# Patient Record
(demographics unavailable — no encounter records)

---

## 2024-10-08 NOTE — OB HISTORY
[Regular Cycle Intervals] : periods have been regular [Menarche Age: ____] : age at menarche was [unfilled] [___] : Full Term: [unfilled]

## 2024-10-09 NOTE — HISTORY OF PRESENT ILLNESS
[Disease: _____________________] : Disease: [unfilled] [T: ___] : T[unfilled] [N: ___] : N[unfilled] [M: ___] : M[unfilled] [AJCC Stage: ____] : AJCC Stage: [unfilled] [de-identified] : Metastatic left breast cancer with bone metastases diagnosed at age 39. 12/26/21 CTA done when patient presented with right sided CP revealed no evidence of pulmonary embolism. Appearance of osseous metastases within the spine and ribs with associated pathologic fractures of the right third rib, right sixth rib and superior endplate of the T3 vertebral body. Subtle asymmetric skin thickening in the left breast and left axillary lymphadenopathy is suspicious for underlying left sided breast cancer. Left axillary lymphadenopathy is noted.  12/30/21 Mammogram and sonogram revealed a 7 x 5.6 x 6.5 asymmetry with nodularity, calcification and distortion in the left superior breast extending across the midline. Left axillary lymphadenopathy is noted. 01/08/22 CT scan C/A/P: Few scattered subcentimeter pulmonary nodules up to 3 mm in size are identified. Left axillary pathologic adenopathy with lymph node conglomerate measuring 2.9 x 1.9 cm, similar to prior. Several additional borderline enlarged left-sided retropectoral nodes are identified. There is a left internal mammary node measuring 5 mm. Small volume right axillary nodes. Tiny supraclavicular nodes. Anterior mediastinal soft tissue with interspersed fat is favored to represent involuting/residual thymus. There is a 3.0 x 2.0 cm left breast mass, concerning for primary neoplasm. There is associated left breast skin thickening. Inferior right hepatic lobe lesions again identified, with peripheral discontinuous nodular enhancement, slightly increased since 2018, most consistent with hemangiomas. There is a 1.5 cm nodular intraluminal soft tissue density associated with the proximal transverse colon. Scattered lytic lesions of the bones are noted at T3 associated with superior endplate depression, T8, and T11 vertebral bodies. Right anterior third and anterolateral sixth rib probable pathologic fractures. There are additional L2-L4 vertebral body lytic lesions. Additionally, there is a right sacral mass which is extending into the sacroiliac joint and neural foramina, image 125 series 2 measuring 4.2 x 3.3 cm. Solid nodular component within the right sacral mass measures 2.6 cm. 1/13/22 Sonogram guided biopsy of the left breast revealed DCIS and invasive ductal carcinoma, moderately differentiated, ER/[place] 90%, HER-2 negative (IHC 1+ membrane staining). Biopsy of left axilla positive for similar IDC. Biopsy of right breast at 9:00 revealed fibrocystic changes and usual ductal hyperplasia. 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 Increased uptake in right 6th rib c/w fracture noted on CT. Photopenic are in right sacrum c/w right sacral mass on CT. Additional focal uptake in the left parietal bone. 2/04/22 Letrozole and Ibrance started. Ibrance held for one week during radiation 2/16/22 - 02/23/22 Palliative radiation to the sacrum, 2000 cGy in 5 fractions and T2-4 with 2000 cGy in 5 fractions with Dr. Samantha Batres for severe pain 4/7/22 BSO showed benign fallopian tubes and ovaries. 4/2023 Anastrozole started in place of letrozole due to arthralgias with significant improvement. 7/5/23 Guardant 360 did not show any actionable mutations. [de-identified] : ~4 cm Invasive ductal carcinoma, SBR 6/9, +LVI, ER/[place] 90%, HER-2 negative [de-identified] : CA 27-29 54.6 prior to treatment and then normalized, CEA normal [de-identified] : Jacy started Lupron 1/21/22 and then 2 weeks later started on Letrozole and Ibrance on 02/04/22. S/P BSO on 4/7/22.   She is currently on her week off of the  Ibrance. Xgeva due today and again in 01/2025 In 04/ 2023, she transitioned from letrozole to anastrozole, which resolved her generalized arthralgia; however, her right knee continued to be problematic.  She received a cortisone injection in her right knee in June 2023, providing relief from the knee pain for several months, and later had a course of viscous gel injections in 6/24 which helped. She still occasionally gets some discomfort with prolonged standing or walking. She continues to struggle with the hot flashes, particularly in the warm weather. Discussed trying Veozah. Her dermatologist prescribed Opzelura for her progressing Vitiligo, particularly on her face. She has hesitated to start the cream because it may potentially lower her WBC, and she is concerned since her WBC is already low from taking Ibrance. She has chronic GERD which is under control with Protonix and Pepcid. She can't take NSAIDs (including Celebrex) on a regular basis due to her stomach. She is on Lexapro 20 mg which has helped but she continues to struggle emotionally with her diagnosis and treatment effects, as well as the need for ongoing monitoring. Although she sees a therapist, she found attending a support group to be distressing. She has confided in close friends but has not disclosed her situation to others. She is  and has two children who are 9 and 11. Currently, her children are unaware of her diagnosis. She is employed as a  and has recently been promoted.  In 10/23 she developed right hip pain and had an MRI on 12/19/23 which showed a mild labral tear and tendinosis.  PCP: Rosie Myers MD Endocrinologist: Nelia Genao MD GYN: Milady Lamb MD  10/3/24 Bone scan; Abnormal bone scan. Persistent abnormalities in the skull, right sixth rib, and right sacroiliac region without significant interval change since the previous study of 5/15/2024. 10/03/24CT scan C/A/P showed Stable exam compared to prior 5/15/2024. 5/15/24 CT scan C/A/P showed bony metastatic disease, not significantly changed from prior examination 2/20/2024. Left pelvic soft tissue density, probably enlarged lymph node, also not significantly changed. New healing fracture of the right anterior lateral sixth rib. 5/15/24 Bone scan showed interval increase in uptake in a right 6th rib lesion. Interval resolution of uptake in the right 4th and 5th ribs. 2/20/24 CT scan C/A/P showed enlarging left lower quadrant/pelvic soft tissue nodular density measuring 1.5 cm x 1.2 cm anterior to the left external iliac artery, series 2 image 140 may represent an enlarging lymph node versus mesenteric implant. Stable osseous metastasis. 02/20/24 Bone scan showed new punctate right rib lesions, which are most consistent with posttraumatic changes. Recommend interval short-term follow-up to assess for healing versus progression of disease. Bone metastases evident on prior exam are similar to or less apparent than on prior exam. 11/10/23 Bone scan showed no significant interval change compared to the previous bone scan of 7/24/2023. 11/10/23 CT scan showed no significant change. Stable presumed bone metastases. 7/24/23 Bone scan showed Interval improvement of lesions along the right femoral shaft and in the right hemisacrum. Otherwise grossly stable exam. 7/19/23 CT scan showed stable disease. 4/14/23 CT scan showed stable disease. 1/16/23 Bone scan showed stability with slight interval improvement in previously noted skeletal foci of disease. No new foci. 12/8/22 CT scan C/A/P showed stable interval examination with stable scattered bilateral small pulmonary nodules and no significant interval change in mixed lytic and sclerotic osseous metastases. Redemonstrated ill defined left breast mass with biopsy clip and associated left spiculated axillary lymph node with biopsy clip. 9/16/22 CT scan C/A/P showed the 3.7 cm right sacral mass is unchanged. Small sclerotic lesion in the left iliac wing is unchanged in size, more sclerotic on the current study. Mixed lytic and sclerotic osseous metastases are again noted, some of which demonstrate slightly increased peripheral sclerosis, likely reflecting posttreatment change. Previously noted tiny bilateral subcentimeter pulmonary nodules are largely unchanged. A previously referenced 1 to 2 mm right lower lobe pulmonary nodule is not definitively seen on the current exam however. 07/01/22 Bone scan showed tracer distribution of metastatic lesions is not significantly changed. Tracer activity is mostly less intense/unchanged except in the right sacrum which has more intense uptake. Small foci of increased uptake in the mid and distal right femur, more conspicuous than the previous study. Degenerative disease in major joints. 4/26/22 CT scan C/A/P Interval decrease in size of left breast mass and axillary adenopathy. Majority of tiny pulmonary nodules are no longer visualized, as described above. Several lytic bone metastases demonstrate increased sclerosis, suggesting healing. A 3.6 cm right sacral lytic mass is unchanged. Indeterminate nodular fold thickening within the proximal/mid transverse colon, less prominent, however correlation with colonoscopy is suggested. Other findings are unchanged, as described above. 02/03/22 Bone scan showed increased uptake in T3, punctate uptake in T7 on the right, focal uptake in L3 and right L4, c/w lytic lesions on CT. Increased uptake in right 6th rib c/w fracture noted on CT. Photopenic are in right sacrum c/w right sacral mass on CT. Additional focal uptake in the left parietal bone. 01/08/22 CT scan C/A/P: Few scattered subcentimeter pulmonary nodules up to 3 mm in size are identified. Left axillary pathologic adenopathy with lymph node conglomerate measuring 2.9 x 1.9 cm, similar to prior. Several additional borderline enlarged left-sided retropectoral nodes are identified. There is a left internal mammary node measuring 5 mm. Small volume right axillary nodes. Tiny supraclavicular nodes. Anterior mediastinal soft tissue with interspersed fat is favored to represent involuting/residual thymus. There is a 3.0 x 2.0 cm left breast mass, concerning for primary neoplasm. There is associated left breast skin thickening. Inferior right hepatic lobe lesions again identified, with peripheral discontinuous nodular enhancement, slightly increased since 2018, most consistent with hemangiomas. There is a 1.5 cm nodular intraluminal soft tissue density associated with the proximal transverse colon. Scattered lytic lesions of the bones are noted at T3 associated with superior endplate depression, T8, and T11 vertebral bodies. Right anterior third and anterolateral sixth rib probable pathologic fractures. There are additional L2-L4 vertebral body lytic lesions. Additionally, there is a right sacral mass which is extending into the sacroiliac joint and neural foramina, image 125 series 2 measuring 4.2 x 3.3 cm. Solid nodular component within the right sacral mass measures 2.6 cm.

## 2024-10-09 NOTE — PHYSICAL EXAM
[Fully active, able to carry on all pre-disease performance without restriction] : Status 0 - Fully active, able to carry on all pre-disease performance without restriction [Normal] : affect appropriate [de-identified] : Left breast mass ~3 x 3 cm. No palpable LN [de-identified] : hypopigmentation of on forehead and perioral region and nasal region.

## 2024-10-09 NOTE — HISTORY OF PRESENT ILLNESS
[Disease: _____________________] : Disease: [unfilled] [T: ___] : T[unfilled] [N: ___] : N[unfilled] [M: ___] : M[unfilled] [AJCC Stage: ____] : AJCC Stage: [unfilled] [de-identified] : Metastatic left breast cancer with bone metastases diagnosed at age 39. 12/26/21 CTA done when patient presented with right sided CP revealed no evidence of pulmonary embolism. Appearance of osseous metastases within the spine and ribs with associated pathologic fractures of the right third rib, right sixth rib and superior endplate of the T3 vertebral body. Subtle asymmetric skin thickening in the left breast and left axillary lymphadenopathy is suspicious for underlying left sided breast cancer. Left axillary lymphadenopathy is noted.  12/30/21 Mammogram and sonogram revealed a 7 x 5.6 x 6.5 asymmetry with nodularity, calcification and distortion in the left superior breast extending across the midline. Left axillary lymphadenopathy is noted. 01/08/22 CT scan C/A/P: Few scattered subcentimeter pulmonary nodules up to 3 mm in size are identified. Left axillary pathologic adenopathy with lymph node conglomerate measuring 2.9 x 1.9 cm, similar to prior. Several additional borderline enlarged left-sided retropectoral nodes are identified. There is a left internal mammary node measuring 5 mm. Small volume right axillary nodes. Tiny supraclavicular nodes. Anterior mediastinal soft tissue with interspersed fat is favored to represent involuting/residual thymus. There is a 3.0 x 2.0 cm left breast mass, concerning for primary neoplasm. There is associated left breast skin thickening. Inferior right hepatic lobe lesions again identified, with peripheral discontinuous nodular enhancement, slightly increased since 2018, most consistent with hemangiomas. There is a 1.5 cm nodular intraluminal soft tissue density associated with the proximal transverse colon. Scattered lytic lesions of the bones are noted at T3 associated with superior endplate depression, T8, and T11 vertebral bodies. Right anterior third and anterolateral sixth rib probable pathologic fractures. There are additional L2-L4 vertebral body lytic lesions. Additionally, there is a right sacral mass which is extending into the sacroiliac joint and neural foramina, image 125 series 2 measuring 4.2 x 3.3 cm. Solid nodular component within the right sacral mass measures 2.6 cm. 1/13/22 Sonogram guided biopsy of the left breast revealed DCIS and invasive ductal carcinoma, moderately differentiated, ER/[place] 90%, HER-2 negative (IHC 1+ membrane staining). Biopsy of left axilla positive for similar IDC. Biopsy of right breast at 9:00 revealed fibrocystic changes and usual ductal hyperplasia. 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 Increased uptake in right 6th rib c/w fracture noted on CT. Photopenic are in right sacrum c/w right sacral mass on CT. Additional focal uptake in the left parietal bone. 2/04/22 Letrozole and Ibrance started. Ibrance held for one week during radiation 2/16/22 - 02/23/22 Palliative radiation to the sacrum, 2000 cGy in 5 fractions and T2-4 with 2000 cGy in 5 fractions with Dr. Samantha Batres for severe pain 4/7/22 BSO showed benign fallopian tubes and ovaries. 4/2023 Anastrozole started in place of letrozole due to arthralgias with significant improvement. 7/5/23 Guardant 360 did not show any actionable mutations. [de-identified] : ~4 cm Invasive ductal carcinoma, SBR 6/9, +LVI, ER/[place] 90%, HER-2 negative [de-identified] : CA 27-29 54.6 prior to treatment and then normalized, CEA normal [de-identified] : Jacy started Lupron 1/21/22 and then 2 weeks later started on Letrozole and Ibrance on 02/04/22. S/P BSO on 4/7/22.   She is currently on her week off of the  Ibrance. Xgeva due today and again in 01/2025 In 04/ 2023, she transitioned from letrozole to anastrozole, which resolved her generalized arthralgia; however, her right knee continued to be problematic.  She received a cortisone injection in her right knee in June 2023, providing relief from the knee pain for several months, and later had a course of viscous gel injections in 6/24 which helped. She still occasionally gets some discomfort with prolonged standing or walking. She continues to struggle with the hot flashes, particularly in the warm weather. Discussed trying Veozah. Her dermatologist prescribed Opzelura for her progressing Vitiligo, particularly on her face. She has hesitated to start the cream because it may potentially lower her WBC, and she is concerned since her WBC is already low from taking Ibrance. She has chronic GERD which is under control with Protonix and Pepcid. She can't take NSAIDs (including Celebrex) on a regular basis due to her stomach. She is on Lexapro 20 mg which has helped but she continues to struggle emotionally with her diagnosis and treatment effects, as well as the need for ongoing monitoring. Although she sees a therapist, she found attending a support group to be distressing. She has confided in close friends but has not disclosed her situation to others. She is  and has two children who are 9 and 11. Currently, her children are unaware of her diagnosis. She is employed as a  and has recently been promoted.  In 10/23 she developed right hip pain and had an MRI on 12/19/23 which showed a mild labral tear and tendinosis.  PCP: Rosie Myers MD Endocrinologist: Nelia Genao MD GYN: Milady Lamb MD  10/3/24 Bone scan; Abnormal bone scan. Persistent abnormalities in the skull, right sixth rib, and right sacroiliac region without significant interval change since the previous study of 5/15/2024. 10/03/24CT scan C/A/P showed Stable exam compared to prior 5/15/2024. 5/15/24 CT scan C/A/P showed bony metastatic disease, not significantly changed from prior examination 2/20/2024. Left pelvic soft tissue density, probably enlarged lymph node, also not significantly changed. New healing fracture of the right anterior lateral sixth rib. 5/15/24 Bone scan showed interval increase in uptake in a right 6th rib lesion. Interval resolution of uptake in the right 4th and 5th ribs. 2/20/24 CT scan C/A/P showed enlarging left lower quadrant/pelvic soft tissue nodular density measuring 1.5 cm x 1.2 cm anterior to the left external iliac artery, series 2 image 140 may represent an enlarging lymph node versus mesenteric implant. Stable osseous metastasis. 02/20/24 Bone scan showed new punctate right rib lesions, which are most consistent with posttraumatic changes. Recommend interval short-term follow-up to assess for healing versus progression of disease. Bone metastases evident on prior exam are similar to or less apparent than on prior exam. 11/10/23 Bone scan showed no significant interval change compared to the previous bone scan of 7/24/2023. 11/10/23 CT scan showed no significant change. Stable presumed bone metastases. 7/24/23 Bone scan showed Interval improvement of lesions along the right femoral shaft and in the right hemisacrum. Otherwise grossly stable exam. 7/19/23 CT scan showed stable disease. 4/14/23 CT scan showed stable disease. 1/16/23 Bone scan showed stability with slight interval improvement in previously noted skeletal foci of disease. No new foci. 12/8/22 CT scan C/A/P showed stable interval examination with stable scattered bilateral small pulmonary nodules and no significant interval change in mixed lytic and sclerotic osseous metastases. Redemonstrated ill defined left breast mass with biopsy clip and associated left spiculated axillary lymph node with biopsy clip. 9/16/22 CT scan C/A/P showed the 3.7 cm right sacral mass is unchanged. Small sclerotic lesion in the left iliac wing is unchanged in size, more sclerotic on the current study. Mixed lytic and sclerotic osseous metastases are again noted, some of which demonstrate slightly increased peripheral sclerosis, likely reflecting posttreatment change. Previously noted tiny bilateral subcentimeter pulmonary nodules are largely unchanged. A previously referenced 1 to 2 mm right lower lobe pulmonary nodule is not definitively seen on the current exam however. 07/01/22 Bone scan showed tracer distribution of metastatic lesions is not significantly changed. Tracer activity is mostly less intense/unchanged except in the right sacrum which has more intense uptake. Small foci of increased uptake in the mid and distal right femur, more conspicuous than the previous study. Degenerative disease in major joints. 4/26/22 CT scan C/A/P Interval decrease in size of left breast mass and axillary adenopathy. Majority of tiny pulmonary nodules are no longer visualized, as described above. Several lytic bone metastases demonstrate increased sclerosis, suggesting healing. A 3.6 cm right sacral lytic mass is unchanged. Indeterminate nodular fold thickening within the proximal/mid transverse colon, less prominent, however correlation with colonoscopy is suggested. Other findings are unchanged, as described above. 02/03/22 Bone scan showed increased uptake in T3, punctate uptake in T7 on the right, focal uptake in L3 and right L4, c/w lytic lesions on CT. Increased uptake in right 6th rib c/w fracture noted on CT. Photopenic are in right sacrum c/w right sacral mass on CT. Additional focal uptake in the left parietal bone. 01/08/22 CT scan C/A/P: Few scattered subcentimeter pulmonary nodules up to 3 mm in size are identified. Left axillary pathologic adenopathy with lymph node conglomerate measuring 2.9 x 1.9 cm, similar to prior. Several additional borderline enlarged left-sided retropectoral nodes are identified. There is a left internal mammary node measuring 5 mm. Small volume right axillary nodes. Tiny supraclavicular nodes. Anterior mediastinal soft tissue with interspersed fat is favored to represent involuting/residual thymus. There is a 3.0 x 2.0 cm left breast mass, concerning for primary neoplasm. There is associated left breast skin thickening. Inferior right hepatic lobe lesions again identified, with peripheral discontinuous nodular enhancement, slightly increased since 2018, most consistent with hemangiomas. There is a 1.5 cm nodular intraluminal soft tissue density associated with the proximal transverse colon. Scattered lytic lesions of the bones are noted at T3 associated with superior endplate depression, T8, and T11 vertebral bodies. Right anterior third and anterolateral sixth rib probable pathologic fractures. There are additional L2-L4 vertebral body lytic lesions. Additionally, there is a right sacral mass which is extending into the sacroiliac joint and neural foramina, image 125 series 2 measuring 4.2 x 3.3 cm. Solid nodular component within the right sacral mass measures 2.6 cm.

## 2024-10-09 NOTE — PHYSICAL EXAM
[Fully active, able to carry on all pre-disease performance without restriction] : Status 0 - Fully active, able to carry on all pre-disease performance without restriction [Normal] : affect appropriate [de-identified] : Left breast mass ~3 x 3 cm. No palpable LN [de-identified] : hypopigmentation of on forehead and perioral region and nasal region.

## 2024-10-09 NOTE — PHYSICAL EXAM
[Fully active, able to carry on all pre-disease performance without restriction] : Status 0 - Fully active, able to carry on all pre-disease performance without restriction [Normal] : affect appropriate [de-identified] : Left breast mass ~3 x 3 cm. No palpable LN [de-identified] : hypopigmentation of on forehead and perioral region and nasal region.

## 2024-10-09 NOTE — HISTORY OF PRESENT ILLNESS
[Disease: _____________________] : Disease: [unfilled] [T: ___] : T[unfilled] [N: ___] : N[unfilled] [M: ___] : M[unfilled] [AJCC Stage: ____] : AJCC Stage: [unfilled] [de-identified] : Metastatic left breast cancer with bone metastases diagnosed at age 39. 12/26/21 CTA done when patient presented with right sided CP revealed no evidence of pulmonary embolism. Appearance of osseous metastases within the spine and ribs with associated pathologic fractures of the right third rib, right sixth rib and superior endplate of the T3 vertebral body. Subtle asymmetric skin thickening in the left breast and left axillary lymphadenopathy is suspicious for underlying left sided breast cancer. Left axillary lymphadenopathy is noted.  12/30/21 Mammogram and sonogram revealed a 7 x 5.6 x 6.5 asymmetry with nodularity, calcification and distortion in the left superior breast extending across the midline. Left axillary lymphadenopathy is noted. 01/08/22 CT scan C/A/P: Few scattered subcentimeter pulmonary nodules up to 3 mm in size are identified. Left axillary pathologic adenopathy with lymph node conglomerate measuring 2.9 x 1.9 cm, similar to prior. Several additional borderline enlarged left-sided retropectoral nodes are identified. There is a left internal mammary node measuring 5 mm. Small volume right axillary nodes. Tiny supraclavicular nodes. Anterior mediastinal soft tissue with interspersed fat is favored to represent involuting/residual thymus. There is a 3.0 x 2.0 cm left breast mass, concerning for primary neoplasm. There is associated left breast skin thickening. Inferior right hepatic lobe lesions again identified, with peripheral discontinuous nodular enhancement, slightly increased since 2018, most consistent with hemangiomas. There is a 1.5 cm nodular intraluminal soft tissue density associated with the proximal transverse colon. Scattered lytic lesions of the bones are noted at T3 associated with superior endplate depression, T8, and T11 vertebral bodies. Right anterior third and anterolateral sixth rib probable pathologic fractures. There are additional L2-L4 vertebral body lytic lesions. Additionally, there is a right sacral mass which is extending into the sacroiliac joint and neural foramina, image 125 series 2 measuring 4.2 x 3.3 cm. Solid nodular component within the right sacral mass measures 2.6 cm. 1/13/22 Sonogram guided biopsy of the left breast revealed DCIS and invasive ductal carcinoma, moderately differentiated, ER/CT 90%, HER-2 negative (IHC 1+ membrane staining). Biopsy of left axilla positive for similar IDC. Biopsy of right breast at 9:00 revealed fibrocystic changes and usual ductal hyperplasia. 1/18/22 Genetic testing with a 19 gene panel revealed no pathogenic variants and a VUS in PTEN c.828T>A ([id number]) 1/21/22 Lupron 7.5 mg started and continued monthly until BSO 1/21/22 Xgeva 120 mg started and will continue every 3 months 02/03/22 Bone scan showed increased uptake in T3, punctate uptake in T7 on the right, focal uptake in L3 and right L4, c/w lytic lesions on CT. Increased uptake in right 6th rib c/w fracture noted on CT. Photopenic are in right sacrum c/w right sacral mass on CT. Additional focal uptake in the left parietal bone. 2/04/22 Letrozole and Ibrance started. Ibrance held for one week during radiation 2/16/22 - 02/23/22 Palliative radiation to the sacrum, 2000 cGy in 5 fractions and T2-4 with 2000 cGy in 5 fractions with Dr. Samantha Batres for severe pain 4/7/22 BSO showed benign fallopian tubes and ovaries. 4/2023 Anastrozole started in place of letrozole due to arthralgias with significant improvement. 7/5/23 Guardant 360 did not show any actionable mutations. [de-identified] : ~4 cm Invasive ductal carcinoma, SBR 6/9, +LVI, ER/[place] 90%, HER-2 negative [de-identified] : CA 27-29 54.6 prior to treatment and then normalized, CEA normal [de-identified] : Jacy started Lupron 1/21/22 and then 2 weeks later started on Letrozole and Ibrance on 02/04/22. S/P BSO on 4/7/22.   She is currently on her week off of the  Ibrance. Xgeva due today and again in 01/2025 In 04/ 2023, she transitioned from letrozole to anastrozole, which resolved her generalized arthralgia; however, her right knee continued to be problematic.  She received a cortisone injection in her right knee in June 2023, providing relief from the knee pain for several months, and later had a course of viscous gel injections in 6/24 which helped. She still occasionally gets some discomfort with prolonged standing or walking. She continues to struggle with the hot flashes, particularly in the warm weather. Discussed trying Veozah. Her dermatologist prescribed Opzelura for her progressing Vitiligo, particularly on her face. She has hesitated to start the cream because it may potentially lower her WBC, and she is concerned since her WBC is already low from taking Ibrance. She has chronic GERD which is under control with Protonix and Pepcid. She can't take NSAIDs (including Celebrex) on a regular basis due to her stomach. She is on Lexapro 20 mg which has helped but she continues to struggle emotionally with her diagnosis and treatment effects, as well as the need for ongoing monitoring. Although she sees a therapist, she found attending a support group to be distressing. She has confided in close friends but has not disclosed her situation to others. She is  and has two children who are 9 and 11. Currently, her children are unaware of her diagnosis. She is employed as a  and has recently been promoted.  In 10/23 she developed right hip pain and had an MRI on 12/19/23 which showed a mild labral tear and tendinosis.  PCP: Rosie Myers MD Endocrinologist: Nelia Genao MD GYN: Milady Lamb MD  10/3/24 Bone scan; Abnormal bone scan. Persistent abnormalities in the skull, right sixth rib, and right sacroiliac region without significant interval change since the previous study of 5/15/2024. 10/03/24CT scan C/A/P showed Stable exam compared to prior 5/15/2024. 5/15/24 CT scan C/A/P showed bony metastatic disease, not significantly changed from prior examination 2/20/2024. Left pelvic soft tissue density, probably enlarged lymph node, also not significantly changed. New healing fracture of the right anterior lateral sixth rib. 5/15/24 Bone scan showed interval increase in uptake in a right 6th rib lesion. Interval resolution of uptake in the right 4th and 5th ribs. 2/20/24 CT scan C/A/P showed enlarging left lower quadrant/pelvic soft tissue nodular density measuring 1.5 cm x 1.2 cm anterior to the left external iliac artery, series 2 image 140 may represent an enlarging lymph node versus mesenteric implant. Stable osseous metastasis. 02/20/24 Bone scan showed new punctate right rib lesions, which are most consistent with posttraumatic changes. Recommend interval short-term follow-up to assess for healing versus progression of disease. Bone metastases evident on prior exam are similar to or less apparent than on prior exam. 11/10/23 Bone scan showed no significant interval change compared to the previous bone scan of 7/24/2023. 11/10/23 CT scan showed no significant change. Stable presumed bone metastases. 7/24/23 Bone scan showed Interval improvement of lesions along the right femoral shaft and in the right hemisacrum. Otherwise grossly stable exam. 7/19/23 CT scan showed stable disease. 4/14/23 CT scan showed stable disease. 1/16/23 Bone scan showed stability with slight interval improvement in previously noted skeletal foci of disease. No new foci. 12/8/22 CT scan C/A/P showed stable interval examination with stable scattered bilateral small pulmonary nodules and no significant interval change in mixed lytic and sclerotic osseous metastases. Redemonstrated ill defined left breast mass with biopsy clip and associated left spiculated axillary lymph node with biopsy clip. 9/16/22 CT scan C/A/P showed the 3.7 cm right sacral mass is unchanged. Small sclerotic lesion in the left iliac wing is unchanged in size, more sclerotic on the current study. Mixed lytic and sclerotic osseous metastases are again noted, some of which demonstrate slightly increased peripheral sclerosis, likely reflecting posttreatment change. Previously noted tiny bilateral subcentimeter pulmonary nodules are largely unchanged. A previously referenced 1 to 2 mm right lower lobe pulmonary nodule is not definitively seen on the current exam however. 07/01/22 Bone scan showed tracer distribution of metastatic lesions is not significantly changed. Tracer activity is mostly less intense/unchanged except in the right sacrum which has more intense uptake. Small foci of increased uptake in the mid and distal right femur, more conspicuous than the previous study. Degenerative disease in major joints. 4/26/22 CT scan C/A/P Interval decrease in size of left breast mass and axillary adenopathy. Majority of tiny pulmonary nodules are no longer visualized, as described above. Several lytic bone metastases demonstrate increased sclerosis, suggesting healing. A 3.6 cm right sacral lytic mass is unchanged. Indeterminate nodular fold thickening within the proximal/mid transverse colon, less prominent, however correlation with colonoscopy is suggested. Other findings are unchanged, as described above. 02/03/22 Bone scan showed increased uptake in T3, punctate uptake in T7 on the right, focal uptake in L3 and right L4, c/w lytic lesions on CT. Increased uptake in right 6th rib c/w fracture noted on CT. Photopenic are in right sacrum c/w right sacral mass on CT. Additional focal uptake in the left parietal bone. 01/08/22 CT scan C/A/P: Few scattered subcentimeter pulmonary nodules up to 3 mm in size are identified. Left axillary pathologic adenopathy with lymph node conglomerate measuring 2.9 x 1.9 cm, similar to prior. Several additional borderline enlarged left-sided retropectoral nodes are identified. There is a left internal mammary node measuring 5 mm. Small volume right axillary nodes. Tiny supraclavicular nodes. Anterior mediastinal soft tissue with interspersed fat is favored to represent involuting/residual thymus. There is a 3.0 x 2.0 cm left breast mass, concerning for primary neoplasm. There is associated left breast skin thickening. Inferior right hepatic lobe lesions again identified, with peripheral discontinuous nodular enhancement, slightly increased since 2018, most consistent with hemangiomas. There is a 1.5 cm nodular intraluminal soft tissue density associated with the proximal transverse colon. Scattered lytic lesions of the bones are noted at T3 associated with superior endplate depression, T8, and T11 vertebral bodies. Right anterior third and anterolateral sixth rib probable pathologic fractures. There are additional L2-L4 vertebral body lytic lesions. Additionally, there is a right sacral mass which is extending into the sacroiliac joint and neural foramina, image 125 series 2 measuring 4.2 x 3.3 cm. Solid nodular component within the right sacral mass measures 2.6 cm.

## 2024-11-05 NOTE — HISTORY OF PRESENT ILLNESS
[de-identified] : Metastatic left breast cancer with bone metastases diagnosed at age 39. 12/26/21 CTA done when patient presented with right sided CP revealed no evidence of pulmonary embolism. Appearance of osseous metastases within the spine and ribs with associated pathologic fractures of the right third rib, right sixth rib and superior endplate of the T3 vertebral body. Subtle asymmetric skin thickening in the left breast and left axillary lymphadenopathy is suspicious for underlying left sided breast cancer. Left axillary lymphadenopathy is noted.  12/30/21 Mammogram and sonogram revealed a 7 x 5.6 x 6.5 asymmetry with nodularity, calcification and distortion in the left superior breast extending across the midline. Left axillary lymphadenopathy is noted. 01/08/22 CT scan C/A/P: Few scattered subcentimeter pulmonary nodules up to 3 mm in size are identified. Left axillary pathologic adenopathy with lymph node conglomerate measuring 2.9 x 1.9 cm, similar to prior. Several additional borderline enlarged left-sided retropectoral nodes are identified. There is a left internal mammary node measuring 5 mm. Small volume right axillary nodes. Tiny supraclavicular nodes. Anterior mediastinal soft tissue with interspersed fat is favored to represent involuting/residual thymus. There is a 3.0 x 2.0 cm left breast mass, concerning for primary neoplasm. There is associated left breast skin thickening. Inferior right hepatic lobe lesions again identified, with peripheral discontinuous nodular enhancement, slightly increased since 2018, most consistent with hemangiomas. There is a 1.5 cm nodular intraluminal soft tissue density associated with the proximal transverse colon. Scattered lytic lesions of the bones are noted at T3 associated with superior endplate depression, T8, and T11 vertebral bodies. Right anterior third and anterolateral sixth rib probable pathologic fractures. There are additional L2-L4 vertebral body lytic lesions. Additionally, there is a right sacral mass which is extending into the sacroiliac joint and neural foramina, image 125 series 2 measuring 4.2 x 3.3 cm. Solid nodular component within the right sacral mass measures 2.6 cm. 1/13/22 Sonogram guided biopsy of the left breast revealed DCIS and invasive ductal carcinoma, moderately differentiated, ER/[place] 90%, HER-2 negative (IHC 1+ membrane staining). Biopsy of left axilla positive for similar IDC. Biopsy of right breast at 9:00 revealed fibrocystic changes and usual ductal hyperplasia. 1/18/22 Genetic testing with a 19 gene panel revealed no pathogenic variants and a VUS in PTEN c.828T>A ([id number]) 1/21/22 Lupron 7.5 mg started and continued monthly until BSO 1/21/22 Xgeva 120 mg started and will continue every 3 months 02/03/22 Bone scan showed increased uptake in T3, punctate uptake in T7 on the right, focal uptake in L3 and right L4, c/w lytic lesions on CT. Increased uptake in right 6th rib c/w fracture noted on CT. Photopenic are in right sacrum c/w right sacral mass on CT. Additional focal uptake in the left parietal bone. 2/04/22 Letrozole and Ibrance started. Ibrance held for one week during radiation 2/16/22 - 02/23/22 Palliative radiation to the sacrum, 2000 cGy in 5 fractions and T2-4 with 2000 cGy in 5 fractions with Dr. Samantha Batres for severe pain 4/7/22 BSO showed benign fallopian tubes and ovaries. 4/2023 Anastrozole started in place of letrozole due to arthralgias with significant improvement. 7/5/23 Guardant 360 did not show any actionable mutations. [de-identified] : ~4 cm Invasive ductal carcinoma, SBR 6/9, +LVI, ER/[place] 90%, HER-2 negative [de-identified] : CA 27-29 54.6 prior to treatment and then normalized, CEA normal [de-identified] : Jacy started Lupron 1/21/22 and then 2 weeks later started on Letrozole and Ibrance on 02/04/22. S/P BSO on 4/7/22.   She is currently on her week off Ibrance. Xgeva given in 10/24 and next due in 01/2025 In 04/2023, she transitioned from letrozole to anastrozole with improvement in her generalized arthralgia. Her right knee continues to be problematic.  She received a cortisone injection in her right knee in 6/23 and later had a course of viscous gel injections in 6/24 which helped. She still occasionally gets some discomfort with prolonged standing or walking. She is taking glucosamine chondroitin which has helped a bit. In 10/23 she developed right hip pain and had an MRI on 12/19/23 which showed a mild labral tear and tendinosis. The hot flashes have improved since the summer and are manageable. Her dermatologist prescribed Opzelura for her progressing Vitiligo, particularly on her face. She has hesitated to start the cream because it may potentially lower her WBC, and she is concerned since her WBC is already low from taking Ibrance. She has chronic GERD which is under control with Protonix and Pepcid. She can't take NSAIDs (including Celebrex) on a regular basis due to her stomach. She is on Lexapro 20 mg which has helped but she continues to struggle emotionally with her diagnosis and treatment effects but is doing better with time. She is  and has two children who are 9 and 10.5 (3rd and 5th grade). Currently, her children are unaware of her diagnosis.  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  PCP: Rosie Myers MD Endocrinologist: Nelia Genao MD GYN: Milady Lamb MD  10/3/24 Bone scan: Persistent abnormalities in the skull, right sixth rib, and right sacroiliac region without significant interval change since the previous study of 5/15/2024. 10/03/24 CT scan C/A/P showed Stable exam compared to prior 5/15/2024. 5/15/24 CT scan C/A/P showed bony metastatic disease, not significantly changed from prior examination 2/20/2024. Left pelvic soft tissue density, probably enlarged lymph node, also not significantly changed. New healing fracture of the right anterior lateral sixth rib. 5/15/24 Bone scan showed interval increase in uptake in a right 6th rib lesion. Interval resolution of uptake in the right 4th and 5th ribs. 2/20/24 CT scan C/A/P showed enlarging left lower quadrant/pelvic soft tissue nodular density measuring 1.5 cm x 1.2 cm anterior to the left external iliac artery, series 2 image 140 may represent an enlarging lymph node versus mesenteric implant. Stable osseous metastasis. 02/20/24 Bone scan showed new punctate right rib lesions, which are most consistent with posttraumatic changes. Recommend interval short-term follow-up to assess for healing versus progression of disease. Bone metastases evident on prior exam are similar to or less apparent than on prior exam. 11/10/23 Bone scan showed no significant interval change compared to the previous bone scan of 7/24/2023. 11/10/23 CT scan showed no significant change. Stable presumed bone metastases. 7/24/23 Bone scan showed Interval improvement of lesions along the right femoral shaft and in the right hemisacrum. Otherwise grossly stable exam. 7/19/23 CT scan showed stable disease. 4/14/23 CT scan showed stable disease. 1/16/23 Bone scan showed stability with slight interval improvement in previously noted skeletal foci of disease. No new foci. 12/8/22 CT scan C/A/P showed stable interval examination with stable scattered bilateral small pulmonary nodules and no significant interval change in mixed lytic and sclerotic osseous metastases. Redemonstrated ill defined left breast mass with biopsy clip and associated left spiculated axillary lymph node with biopsy clip. 9/16/22 CT scan C/A/P showed the 3.7 cm right sacral mass is unchanged. Small sclerotic lesion in the left iliac wing is unchanged in size, more sclerotic on the current study. Mixed lytic and sclerotic osseous metastases are again noted, some of which demonstrate slightly increased peripheral sclerosis, likely reflecting posttreatment change. Previously noted tiny bilateral subcentimeter pulmonary nodules are largely unchanged. A previously referenced 1 to 2 mm right lower lobe pulmonary nodule is not definitively seen on the current exam however. 07/01/22 Bone scan showed tracer distribution of metastatic lesions is not significantly changed. Tracer activity is mostly less intense/unchanged except in the right sacrum which has more intense uptake. Small foci of increased uptake in the mid and distal right femur, more conspicuous than the previous study. Degenerative disease in major joints. 4/26/22 CT scan C/A/P Interval decrease in size of left breast mass and axillary adenopathy. Majority of tiny pulmonary nodules are no longer visualized, as described above. Several lytic bone metastases demonstrate increased sclerosis, suggesting healing. A 3.6 cm right sacral lytic mass is unchanged. Indeterminate nodular fold thickening within the proximal/mid transverse colon, less prominent, however correlation with colonoscopy is suggested. Other findings are unchanged, as described above. 02/03/22 Bone scan showed increased uptake in T3, punctate uptake in T7 on the right, focal uptake in L3 and right L4, c/w lytic lesions on CT. Increased uptake in right 6th rib c/w fracture noted on CT. Photopenic are in right sacrum c/w right sacral mass on CT. Additional focal uptake in the left parietal bone. 01/08/22 CT scan C/A/P: Few scattered subcentimeter pulmonary nodules up to 3 mm in size are identified. Left axillary pathologic adenopathy with lymph node conglomerate measuring 2.9 x 1.9 cm, similar to prior. Several additional borderline enlarged left-sided retropectoral nodes are identified. There is a left internal mammary node measuring 5 mm. Small volume right axillary nodes. Tiny supraclavicular nodes. Anterior mediastinal soft tissue with interspersed fat is favored to represent involuting/residual thymus. There is a 3.0 x 2.0 cm left breast mass, concerning for primary neoplasm. There is associated left breast skin thickening. Inferior right hepatic lobe lesions again identified, with peripheral discontinuous nodular enhancement, slightly increased since 2018, most consistent with hemangiomas. There is a 1.5 cm nodular intraluminal soft tissue density associated with the proximal transverse colon. Scattered lytic lesions of the bones are noted at T3 associated with superior endplate depression, T8, and T11 vertebral bodies. Right anterior third and anterolateral sixth rib probable pathologic fractures. There are additional L2-L4 vertebral body lytic lesions. Additionally, there is a right sacral mass which is extending into the sacroiliac joint and neural foramina, image 125 series 2 measuring 4.2 x 3.3 cm. Solid nodular component within the right sacral mass measures 2.6 cm.

## 2024-11-05 NOTE — HISTORY OF PRESENT ILLNESS
[de-identified] : Metastatic left breast cancer with bone metastases diagnosed at age 39. 12/26/21 CTA done when patient presented with right sided CP revealed no evidence of pulmonary embolism. Appearance of osseous metastases within the spine and ribs with associated pathologic fractures of the right third rib, right sixth rib and superior endplate of the T3 vertebral body. Subtle asymmetric skin thickening in the left breast and left axillary lymphadenopathy is suspicious for underlying left sided breast cancer. Left axillary lymphadenopathy is noted.  12/30/21 Mammogram and sonogram revealed a 7 x 5.6 x 6.5 asymmetry with nodularity, calcification and distortion in the left superior breast extending across the midline. Left axillary lymphadenopathy is noted. 01/08/22 CT scan C/A/P: Few scattered subcentimeter pulmonary nodules up to 3 mm in size are identified. Left axillary pathologic adenopathy with lymph node conglomerate measuring 2.9 x 1.9 cm, similar to prior. Several additional borderline enlarged left-sided retropectoral nodes are identified. There is a left internal mammary node measuring 5 mm. Small volume right axillary nodes. Tiny supraclavicular nodes. Anterior mediastinal soft tissue with interspersed fat is favored to represent involuting/residual thymus. There is a 3.0 x 2.0 cm left breast mass, concerning for primary neoplasm. There is associated left breast skin thickening. Inferior right hepatic lobe lesions again identified, with peripheral discontinuous nodular enhancement, slightly increased since 2018, most consistent with hemangiomas. There is a 1.5 cm nodular intraluminal soft tissue density associated with the proximal transverse colon. Scattered lytic lesions of the bones are noted at T3 associated with superior endplate depression, T8, and T11 vertebral bodies. Right anterior third and anterolateral sixth rib probable pathologic fractures. There are additional L2-L4 vertebral body lytic lesions. Additionally, there is a right sacral mass which is extending into the sacroiliac joint and neural foramina, image 125 series 2 measuring 4.2 x 3.3 cm. Solid nodular component within the right sacral mass measures 2.6 cm. 1/13/22 Sonogram guided biopsy of the left breast revealed DCIS and invasive ductal carcinoma, moderately differentiated, ER/[place] 90%, HER-2 negative (IHC 1+ membrane staining). Biopsy of left axilla positive for similar IDC. Biopsy of right breast at 9:00 revealed fibrocystic changes and usual ductal hyperplasia. 1/18/22 Genetic testing with a 19 gene panel revealed no pathogenic variants and a VUS in PTEN c.[id number] ([id number]) 1/21/22 Lupron 7.5 mg started and continued monthly until BSO 1/21/22 Xgeva 120 mg started and will continue every 3 months 02/03/22 Bone scan showed increased uptake in T3, punctate uptake in T7 on the right, focal uptake in L3 and right L4, c/w lytic lesions on CT. Increased uptake in right 6th rib c/w fracture noted on CT. Photopenic are in right sacrum c/w right sacral mass on CT. Additional focal uptake in the left parietal bone. 2/04/22 Letrozole and Ibrance started. Ibrance held for one week during radiation 2/16/22 - 02/23/22 Palliative radiation to the sacrum, 2000 cGy in 5 fractions and T2-4 with 2000 cGy in 5 fractions with Dr. Samantha Batres for severe pain 4/7/22 BSO showed benign fallopian tubes and ovaries. 4/2023 Anastrozole started in place of letrozole due to arthralgias with significant improvement. 7/5/23 Guardant 360 did not show any actionable mutations. [de-identified] : ~4 cm Invasive ductal carcinoma, SBR 6/9, +LVI, ER/MO 90%, HER-2 negative [de-identified] : CA 27-29 54.6 prior to treatment and then normalized, CEA normal [de-identified] : Jacy started Lupron 1/21/22 and then 2 weeks later started on Letrozole and Ibrance on 02/04/22. S/P BSO on 4/7/22.   She is currently on her week off Ibrance. Xgeva given in 10/24 and next due in 01/2025 In 04/2023, she transitioned from letrozole to anastrozole with improvement in her generalized arthralgia. Her right knee continues to be problematic.  She received a cortisone injection in her right knee in 6/23 and later had a course of viscous gel injections in 6/24 which helped. She still occasionally gets some discomfort with prolonged standing or walking. She is taking glucosamine chondroitin which has helped a bit. In 10/23 she developed right hip pain and had an MRI on 12/19/23 which showed a mild labral tear and tendinosis. The hot flashes have improved since the summer and are manageable. Her dermatologist prescribed Opzelura for her progressing Vitiligo, particularly on her face. She has hesitated to start the cream because it may potentially lower her WBC, and she is concerned since her WBC is already low from taking Ibrance. She has chronic GERD which is under control with Protonix and Pepcid. She can't take NSAIDs (including Celebrex) on a regular basis due to her stomach. She is on Lexapro 20 mg which has helped but she continues to struggle emotionally with her diagnosis and treatment effects but is doing better with time. She is  and has two children who are 9 and 10.5 (3rd and 5th grade). Currently, her children are unaware of her diagnosis.  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  PCP: Rosie Myers MD Endocrinologist: Nelia Genao MD GYN: Milady Lamb MD  10/3/24 Bone scan: Persistent abnormalities in the skull, right sixth rib, and right sacroiliac region without significant interval change since the previous study of 5/15/2024. 10/03/24 CT scan C/A/P showed Stable exam compared to prior 5/15/2024. 5/15/24 CT scan C/A/P showed bony metastatic disease, not significantly changed from prior examination 2/20/2024. Left pelvic soft tissue density, probably enlarged lymph node, also not significantly changed. New healing fracture of the right anterior lateral sixth rib. 5/15/24 Bone scan showed interval increase in uptake in a right 6th rib lesion. Interval resolution of uptake in the right 4th and 5th ribs. 2/20/24 CT scan C/A/P showed enlarging left lower quadrant/pelvic soft tissue nodular density measuring 1.5 cm x 1.2 cm anterior to the left external iliac artery, series 2 image 140 may represent an enlarging lymph node versus mesenteric implant. Stable osseous metastasis. 02/20/24 Bone scan showed new punctate right rib lesions, which are most consistent with posttraumatic changes. Recommend interval short-term follow-up to assess for healing versus progression of disease. Bone metastases evident on prior exam are similar to or less apparent than on prior exam. 11/10/23 Bone scan showed no significant interval change compared to the previous bone scan of 7/24/2023. 11/10/23 CT scan showed no significant change. Stable presumed bone metastases. 7/24/23 Bone scan showed Interval improvement of lesions along the right femoral shaft and in the right hemisacrum. Otherwise grossly stable exam. 7/19/23 CT scan showed stable disease. 4/14/23 CT scan showed stable disease. 1/16/23 Bone scan showed stability with slight interval improvement in previously noted skeletal foci of disease. No new foci. 12/8/22 CT scan C/A/P showed stable interval examination with stable scattered bilateral small pulmonary nodules and no significant interval change in mixed lytic and sclerotic osseous metastases. Redemonstrated ill defined left breast mass with biopsy clip and associated left spiculated axillary lymph node with biopsy clip. 9/16/22 CT scan C/A/P showed the 3.7 cm right sacral mass is unchanged. Small sclerotic lesion in the left iliac wing is unchanged in size, more sclerotic on the current study. Mixed lytic and sclerotic osseous metastases are again noted, some of which demonstrate slightly increased peripheral sclerosis, likely reflecting posttreatment change. Previously noted tiny bilateral subcentimeter pulmonary nodules are largely unchanged. A previously referenced 1 to 2 mm right lower lobe pulmonary nodule is not definitively seen on the current exam however. 07/01/22 Bone scan showed tracer distribution of metastatic lesions is not significantly changed. Tracer activity is mostly less intense/unchanged except in the right sacrum which has more intense uptake. Small foci of increased uptake in the mid and distal right femur, more conspicuous than the previous study. Degenerative disease in major joints. 4/26/22 CT scan C/A/P Interval decrease in size of left breast mass and axillary adenopathy. Majority of tiny pulmonary nodules are no longer visualized, as described above. Several lytic bone metastases demonstrate increased sclerosis, suggesting healing. A 3.6 cm right sacral lytic mass is unchanged. Indeterminate nodular fold thickening within the proximal/mid transverse colon, less prominent, however correlation with colonoscopy is suggested. Other findings are unchanged, as described above. 02/03/22 Bone scan showed increased uptake in T3, punctate uptake in T7 on the right, focal uptake in L3 and right L4, c/w lytic lesions on CT. Increased uptake in right 6th rib c/w fracture noted on CT. Photopenic are in right sacrum c/w right sacral mass on CT. Additional focal uptake in the left parietal bone. 01/08/22 CT scan C/A/P: Few scattered subcentimeter pulmonary nodules up to 3 mm in size are identified. Left axillary pathologic adenopathy with lymph node conglomerate measuring 2.9 x 1.9 cm, similar to prior. Several additional borderline enlarged left-sided retropectoral nodes are identified. There is a left internal mammary node measuring 5 mm. Small volume right axillary nodes. Tiny supraclavicular nodes. Anterior mediastinal soft tissue with interspersed fat is favored to represent involuting/residual thymus. There is a 3.0 x 2.0 cm left breast mass, concerning for primary neoplasm. There is associated left breast skin thickening. Inferior right hepatic lobe lesions again identified, with peripheral discontinuous nodular enhancement, slightly increased since 2018, most consistent with hemangiomas. There is a 1.5 cm nodular intraluminal soft tissue density associated with the proximal transverse colon. Scattered lytic lesions of the bones are noted at T3 associated with superior endplate depression, T8, and T11 vertebral bodies. Right anterior third and anterolateral sixth rib probable pathologic fractures. There are additional L2-L4 vertebral body lytic lesions. Additionally, there is a right sacral mass which is extending into the sacroiliac joint and neural foramina, image 125 series 2 measuring 4.2 x 3.3 cm. Solid nodular component within the right sacral mass measures 2.6 cm.

## 2024-11-05 NOTE — PHYSICAL EXAM
[de-identified] : Left breast mass ~3 x 3 cm. No palpable LN [de-identified] : hypopigmentation of on forehead and perioral region and nasal region.

## 2024-11-05 NOTE — PHYSICAL EXAM
[de-identified] : Left breast mass ~3 x 3 cm. No palpable LN [de-identified] : hypopigmentation of on forehead and perioral region and nasal region.

## 2024-12-03 NOTE — HISTORY OF PRESENT ILLNESS
[Disease: _____________________] : Disease: [unfilled] [T: ___] : T[unfilled] [N: ___] : N[unfilled] [M: ___] : M[unfilled] [AJCC Stage: ____] : AJCC Stage: [unfilled] [de-identified] : Metastatic left breast cancer with bone metastases diagnosed at age 39. 12/26/21 CTA done when patient presented with right sided CP revealed no evidence of pulmonary embolism. Appearance of osseous metastases within the spine and ribs with associated pathologic fractures of the right third rib, right sixth rib and superior endplate of the T3 vertebral body. Subtle asymmetric skin thickening in the left breast and left axillary lymphadenopathy is suspicious for underlying left sided breast cancer. Left axillary lymphadenopathy is noted.  12/30/21 Mammogram and sonogram revealed a 7 x 5.6 x 6.5 asymmetry with nodularity, calcification and distortion in the left superior breast extending across the midline. Left axillary lymphadenopathy is noted. 01/08/22 CT scan C/A/P: Few scattered subcentimeter pulmonary nodules up to 3 mm in size are identified. Left axillary pathologic adenopathy with lymph node conglomerate measuring 2.9 x 1.9 cm, similar to prior. Several additional borderline enlarged left-sided retropectoral nodes are identified. There is a left internal mammary node measuring 5 mm. Small volume right axillary nodes. Tiny supraclavicular nodes. Anterior mediastinal soft tissue with interspersed fat is favored to represent involuting/residual thymus. There is a 3.0 x 2.0 cm left breast mass, concerning for primary neoplasm. There is associated left breast skin thickening. Inferior right hepatic lobe lesions again identified, with peripheral discontinuous nodular enhancement, slightly increased since 2018, most consistent with hemangiomas. There is a 1.5 cm nodular intraluminal soft tissue density associated with the proximal transverse colon. Scattered lytic lesions of the bones are noted at T3 associated with superior endplate depression, T8, and T11 vertebral bodies. Right anterior third and anterolateral sixth rib probable pathologic fractures. There are additional L2-L4 vertebral body lytic lesions. Additionally, there is a right sacral mass which is extending into the sacroiliac joint and neural foramina, image 125 series 2 measuring 4.2 x 3.3 cm. Solid nodular component within the right sacral mass measures 2.6 cm. 1/13/22 Sonogram guided biopsy of the left breast revealed DCIS and invasive ductal carcinoma, moderately differentiated, ER/[place] 90%, HER-2 negative (IHC 1+ membrane staining). Biopsy of left axilla positive for similar IDC. Biopsy of right breast at 9:00 revealed fibrocystic changes and usual ductal hyperplasia. 1/18/22 Genetic testing with a 19 gene panel revealed no pathogenic variants and a VUS in PTEN c.828T>A ([id number]) 1/21/22 Lupron 7.5 mg started and continued monthly until BSO 1/21/22 Xgeva 120 mg started and will continue every 3 months 02/03/22 Bone scan showed increased uptake in T3, punctate uptake in T7 on the right, focal uptake in L3 and right L4, c/w lytic lesions on CT. Increased uptake in right 6th rib c/w fracture noted on CT. Photopenic are in right sacrum c/w right sacral mass on CT. Additional focal uptake in the left parietal bone. 2/04/22 Letrozole and Ibrance started. Ibrance held for one week during radiation 2/16/22 - 02/23/22 Palliative radiation to the sacrum, 2000 cGy in 5 fractions and T2-4 with 2000 cGy in 5 fractions with Dr. Samantha Batres for severe pain 4/7/22 BSO showed benign fallopian tubes and ovaries. 4/2023 Anastrozole started in place of letrozole due to arthralgias with significant improvement. 7/5/23 Guardant 360 did not show any actionable mutations. [de-identified] : ~4 cm Invasive ductal carcinoma, SBR 6/9, +LVI, ER/[place] 90%, HER-2 negative [de-identified] : CA 27-29 54.6 prior to treatment and then normalized, CEA normal [de-identified] : Jacy started Lupron 1/21/22 and then 2 weeks later started on Letrozole and Ibrance on 02/04/22. S/P BSO on 4/7/22.   She is currently on her week off Ibrance. Xgeva given in 10/24 and next due in 01/2025. In 04/2023, she transitioned from letrozole to anastrozole with improvement in her generalized arthralgia. Her right knee continues to be problematic.  She received a cortisone injection in her right knee in 6/23 and later had a course of viscous gel injections in 6/24 which helped. She still occasionally gets some discomfort with prolonged standing or walking. She is taking glucosamine chondroitin which has helped a bit. In 10/23 she developed right hip pain and had an MRI on 12/19/23 which showed a mild labral tear and tendinosis. The hot flashes have improved since the summer and are manageable. Her dermatologist prescribed Opzelura for her progressing Vitiligo, particularly on her face. She has hesitated to start the cream because it may potentially lower her WBC, and she is concerned since her WBC is already low from taking Ibrance. She has chronic GERD which is under control with Protonix and Pepcid. She can't take NSAIDs (including Celebrex) on a regular basis due to her stomach. She is on Lexapro 20 mg which has helped but she continues to struggle emotionally with her diagnosis and treatment effects but is doing better with time. She is  and has two children who are 9 and 10.5 (3rd and 5th grade). Currently, her children are unaware of her diagnosis.  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 She is adjusting and is starting to like this position more.  PCP: Rosie Myers MD Endocrinologist: Nelia Genao MD GYN: Milady Lamb MD  10/3/24 Bone scan: Persistent abnormalities in the skull, right sixth rib, and right sacroiliac region without significant interval change since the previous study of 5/15/2024. 10/03/24 CT scan C/A/P showed Stable exam compared to prior 5/15/2024. 5/15/24 CT scan C/A/P showed bony metastatic disease, not significantly changed from prior examination 2/20/2024. Left pelvic soft tissue density, probably enlarged lymph node, also not significantly changed. New healing fracture of the right anterior lateral sixth rib. 5/15/24 Bone scan showed interval increase in uptake in a right 6th rib lesion. Interval resolution of uptake in the right 4th and 5th ribs. 2/20/24 CT scan C/A/P showed enlarging left lower quadrant/pelvic soft tissue nodular density measuring 1.5 cm x 1.2 cm anterior to the left external iliac artery, series 2 image 140 may represent an enlarging lymph node versus mesenteric implant. Stable osseous metastasis. 02/20/24 Bone scan showed new punctate right rib lesions, which are most consistent with posttraumatic changes. Recommend interval short-term follow-up to assess for healing versus progression of disease. Bone metastases evident on prior exam are similar to or less apparent than on prior exam. 11/10/23 Bone scan showed no significant interval change compared to the previous bone scan of 7/24/2023. 11/10/23 CT scan showed no significant change. Stable presumed bone metastases. 7/24/23 Bone scan showed Interval improvement of lesions along the right femoral shaft and in the right hemisacrum. Otherwise grossly stable exam. 7/19/23 CT scan showed stable disease. 4/14/23 CT scan showed stable disease. 1/16/23 Bone scan showed stability with slight interval improvement in previously noted skeletal foci of disease. No new foci. 12/8/22 CT scan C/A/P showed stable interval examination with stable scattered bilateral small pulmonary nodules and no significant interval change in mixed lytic and sclerotic osseous metastases. Redemonstrated ill defined left breast mass with biopsy clip and associated left spiculated axillary lymph node with biopsy clip. 9/16/22 CT scan C/A/P showed the 3.7 cm right sacral mass is unchanged. Small sclerotic lesion in the left iliac wing is unchanged in size, more sclerotic on the current study. Mixed lytic and sclerotic osseous metastases are again noted, some of which demonstrate slightly increased peripheral sclerosis, likely reflecting posttreatment change. Previously noted tiny bilateral subcentimeter pulmonary nodules are largely unchanged. A previously referenced 1 to 2 mm right lower lobe pulmonary nodule is not definitively seen on the current exam however. 07/01/22 Bone scan showed tracer distribution of metastatic lesions is not significantly changed. Tracer activity is mostly less intense/unchanged except in the right sacrum which has more intense uptake. Small foci of increased uptake in the mid and distal right femur, more conspicuous than the previous study. Degenerative disease in major joints. 4/26/22 CT scan C/A/P Interval decrease in size of left breast mass and axillary adenopathy. Majority of tiny pulmonary nodules are no longer visualized, as described above. Several lytic bone metastases demonstrate increased sclerosis, suggesting healing. A 3.6 cm right sacral lytic mass is unchanged. Indeterminate nodular fold thickening within the proximal/mid transverse colon, less prominent, however correlation with colonoscopy is suggested. Other findings are unchanged, as described above. 02/03/22 Bone scan showed increased uptake in T3, punctate uptake in T7 on the right, focal uptake in L3 and right L4, c/w lytic lesions on CT. Increased uptake in right 6th rib c/w fracture noted on CT. Photopenic are in right sacrum c/w right sacral mass on CT. Additional focal uptake in the left parietal bone. 01/08/22 CT scan C/A/P: Few scattered subcentimeter pulmonary nodules up to 3 mm in size are identified. Left axillary pathologic adenopathy with lymph node conglomerate measuring 2.9 x 1.9 cm, similar to prior. Several additional borderline enlarged left-sided retropectoral nodes are identified. There is a left internal mammary node measuring 5 mm. Small volume right axillary nodes. Tiny supraclavicular nodes. Anterior mediastinal soft tissue with interspersed fat is favored to represent involuting/residual thymus. There is a 3.0 x 2.0 cm left breast mass, concerning for primary neoplasm. There is associated left breast skin thickening. Inferior right hepatic lobe lesions again identified, with peripheral discontinuous nodular enhancement, slightly increased since 2018, most consistent with hemangiomas. There is a 1.5 cm nodular intraluminal soft tissue density associated with the proximal transverse colon. Scattered lytic lesions of the bones are noted at T3 associated with superior endplate depression, T8, and T11 vertebral bodies. Right anterior third and anterolateral sixth rib probable pathologic fractures. There are additional L2-L4 vertebral body lytic lesions. Additionally, there is a right sacral mass which is extending into the sacroiliac joint and neural foramina, image 125 series 2 measuring 4.2 x 3.3 cm. Solid nodular component within the right sacral mass measures 2.6 cm.

## 2024-12-03 NOTE — REASON FOR VISIT
[id number] - Outpatient Follow-up Note  Brenda oTbin 76 y o  male MRN: [id number]  Encounter: [id number]    ASSESSMENT AND PLAN:      1  Change in bowel habit  2  Personal history of colon cancer  High risk for previous CRC   Several months of weekly altered bowel pattern with stool frequency, urgency although formed with gas pains days 1 to 4, liquid diarrhea on day 5, no bowel movement days 6 in 7 then repeated  Stools range from 1 to 6+ per day  Previously 1 formed stool daily  Colonoscopy 01/06/2021 without evidence for CRC, no polyps  There was some erythematous rectal mucosa noted as proctitis versus prep effect  Pathology showed some mild nonspecific acute proctitis  Could this be inflammatory or infectious?    Mild leukocytosis on CBC 12/30  No pathology evidence for microscopic colitis  Imodium helps with loose stools  -recheck CBC; Future  - will try mesalamine (ROWASA) 4 g; Insert 60 mL (4 g total) into the rectum daily at bedtime  Dispense: 30 Bottle; Refill: 1  -may continue to use Imodium as needed  -consider antibiotic course if persistent leukocytosis if symptoms persist despite mesalamine enemas    3  Esophageal dysphagia  4  Roberts's esophagus without dysplasia  5  Gastroesophageal reflux disease with esophagitis  6  Schatzki's ring of distal esophagus  No overt stricture, recurrent Schatzki's ring identified with EGD 01/06/2021 but esophagus dilated empirically to [address] [place] with complete resolution of dysphagia  There was a mild nodular salmon mucosa at the Z-line although pathology negative for Roberts's  May be a component of presbyesophagus associated with the symptoms, currently well controlled with daily PPI  -continue omeprazole (PriLOSEC) 40 MG capsule; Take 1 capsule (40 mg total) by mouth daily  Dispense: 90 capsule;  Refill: 3  -reviewed/advised to continue swallowing safety, reflux diet, lifestyle modifications  -consider barium swallow for recurrent dysphagia    Followup Appointment:  4 weeks  ______________________________________________________________________    Chief Complaint   Patient presents with    Follow-up colonoscopy     HPI:  77-year-old male patient returns to the office to follow up his recent endoscopic evaluation  The results of his EGD, colonoscopy and pathology were reviewed with the patient  Questions were answered  He is not having any UGI or alarm symptoms, dysphagia following EGD with empiric dilation of his esophagus  Continues with daily PPI, dietary discretion  Denies nausea or vomiting  Continues with abnormal bowel pattern described as4-6 urgent, nonbloody formed stools, day 2 he has 3 urgent nonbloody soft stools, days 3 and 4 he has 1-2 still with significant urgency  On day 5 he experiences 3-5 urgent nonbloody liquid stools  Days 6 in 7 he has no bowel movement after the frequent diarrhea on day 5  He does endorse that Imodium helps when he has the loose stools  He is frustrated as on the loose stool days he can have up to 20 per day which interferes with his daily activities  Denies melena, hematochezia  He continues to have a lot of flatus which seems to resolve with defecation  Otherwise denies any lower abdominal or rectal pain  Appetite is normal   Weight is stable      Historical Information   Past Medical History:   Diagnosis Date    Arthritis     [facility]     Cataract     Colitis     [facility]     Diabetes mellitus type II, non insulin dependent ([facility] )     Fatty liver     History of chemotherapy     History of radiation therapy     History of shingles 2018    Hypertension     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[facility]     Pneumonia     Skin cancer      Past Surgical History:   Procedure Laterality Date    AORTIC VALVE REPLACEMENT      COLECTOMY      COLONOSCOPY  11/2019    Prior right hemicolectomy    CORONARY ARTERY BYPASS GRAFT      DENTAL SURGERY      KNEE SURGERY      LYMPHADENECTOMY      OTHER SURGICAL HISTORY      MAZE PROCEDURE    SD TOTAL KNEE ARTHROPLASTY Left 2019    Procedure: ARTHROPLASTY LEFT KNEE TOTAL;  Surgeon: Armando Celaya MD;  Location:  MAIN OR;  Service: Orthopedics    SKIN BIOPSY      UPPER GASTROINTESTINAL ENDOSCOPY  2019    Schatzki ring     Social History     Substance and Sexual Activity   Alcohol Use Yes    Frequency: 2-4 times a month    Drinks per session: 1 or 2    Binge frequency: Never    Comment: has not drank in over a month     Social History     Substance and Sexual Activity   Drug Use No     Social History     Tobacco Use   Smoking Status Former Smoker    Packs/day: 1 00    Years: 40 00    Pack years: 40 00    Types: Cigarettes    Quit date:     Years since quittin 0   Smokeless Tobacco Never Used     Family History   Problem Relation Age of Onset    Heart block Family     Coronary artery disease Mother     Thrombosis Mother     Hypertension Mother     Arthritis Mother     Hyperlipidemia Mother     Diabetes Father     Hypertension Father     Arthritis Father     Sudden death Father         cardiac    Colon cancer Paternal Grandfather     Breast cancer Sister     Heart disease Brother         Coronary stents         Current Outpatient Medications:     aspirin 81 MG tablet    atorvastatin (LIPITOR) 40 mg tablet    furosemide (LASIX) 20 mg tablet    lisinopril (ZESTRIL) 20 mg tablet    metFORMIN (GLUCOPHAGE-XR) 500 mg 24 hr tablet    metoprolol succinate (TOPROL-XL) 100 mg 24 hr tablet    Multiple Vitamins-Minerals (ICAPS AREDS 2) CAPS    nitroglycerin (NITROSTAT) 0 3 mg SL tablet    omeprazole (PriLOSEC) 40 MG capsule    ascorbic acid (VITAMIN C) 500 mg tablet    ferrous sulfate 324 MG TBEC    mesalamine (ROWASA) 4 g    Multiple Vitamin (MULTI-VITAMIN) tablet  Allergies   Allergen Reactions    Ceftin [Cefuroxime] Itching     However, has tolerated Cefazolin and Pip-Tazo since, which have different side chains  Be cautious with / avoid 2nd, 3rd, or 4th Gen Cephs that have similar side chains to Cefuroxime  Reviewed medications and allergies and updated as indicated    ROS:  All systems were reviewed and positives noted as per HPI  All other systems were reported as negative  PHYSICAL EXAM:    Blood pressure 106/56, pulse 79, height 5' 11" (1 803 m), weight 77 3 kg (170 lb 6 4 oz)  Body mass index is 23 77 kg/m²  General Appearance: NAD, cooperative, alert  Head:  Normocephalic, atraumatic  Eyes: Anicteric, PERRLA, EOMI  ENT:  Normal external appearance, normal mucosa  Neck:  Supple, symmetrical, trachea midline  Resp:  Clear to auscultation bilaterally; no rales, rhonchi or wheezing; respirations unlabored   CV:  S1 S2, Regular rate and rhythm; no murmur, rub, or gallop  GI:  Soft, non-tender, non-distended; normal bowel sounds; no masses, no organomegaly   Rectal: Deferred  Musculoskeletal: No cyanosis, clubbing or edema  Normal ROM  Skin:  No jaundice, rashes, or lesions   Heme/Lymph: No palpable cervical lymphadenopathy  Psych: Normal affect, good eye contact  Neuro: No gross deficits, AAOx3    Lab Results:  Reviewed lab results from 12/30/2020  Lab Results   Component Value Date    WBC 12 7 (H) 12/30/2020    HGB 10 1 (L) 12/30/2020    HCT 32 7 (L) 12/30/2020    MCV 80 12/30/2020     12/30/2020     Lab Results   Component Value Date     05/19/2017    K 3 6 12/30/2020     12/30/2020    CO2 27 12/30/2020    ANIONGAP 7 02/22/2014    BUN 14 12/30/2020    CREATININE 0 95 12/30/2020    GLUCOSE 204 (H) 10/13/2017    GLUF 167 (H) 08/05/2016    CALCIUM 9 2 09/12/2019    AST 12 12/30/2020    ALT 9 12/30/2020    ALKPHOS 121 (H) 09/11/2019    PROT 6 5 05/19/2017    BILITOT 0 9 05/19/2017    EGFR 71 09/12/2019     No results found for: IRON, TIBC, FERRITIN  Lab Results   Component Value Date    LIPASE 173 11/25/2018       Radiology Results:   No results found  [Follow-Up Visit] : a follow-up [FreeTextEntry2] : Metastatic breast cancer

## 2024-12-03 NOTE — PHYSICAL EXAM
[Fully active, able to carry on all pre-disease performance without restriction] : Status 0 - Fully active, able to carry on all pre-disease performance without restriction [Normal] : affect appropriate [de-identified] : Left breast mass ~3 x 3 cm. No palpable LN [de-identified] : hypopigmentation of on forehead and perioral region and nasal region.

## 2024-12-03 NOTE — HISTORY OF PRESENT ILLNESS
[Disease: _____________________] : Disease: [unfilled] [T: ___] : T[unfilled] [N: ___] : N[unfilled] [M: ___] : M[unfilled] [AJCC Stage: ____] : AJCC Stage: [unfilled] [de-identified] : Metastatic left breast cancer with bone metastases diagnosed at age 39. 12/26/21 CTA done when patient presented with right sided CP revealed no evidence of pulmonary embolism. Appearance of osseous metastases within the spine and ribs with associated pathologic fractures of the right third rib, right sixth rib and superior endplate of the T3 vertebral body. Subtle asymmetric skin thickening in the left breast and left axillary lymphadenopathy is suspicious for underlying left sided breast cancer. Left axillary lymphadenopathy is noted.  12/30/21 Mammogram and sonogram revealed a 7 x 5.6 x 6.5 asymmetry with nodularity, calcification and distortion in the left superior breast extending across the midline. Left axillary lymphadenopathy is noted. 01/08/22 CT scan C/A/P: Few scattered subcentimeter pulmonary nodules up to 3 mm in size are identified. Left axillary pathologic adenopathy with lymph node conglomerate measuring 2.9 x 1.9 cm, similar to prior. Several additional borderline enlarged left-sided retropectoral nodes are identified. There is a left internal mammary node measuring 5 mm. Small volume right axillary nodes. Tiny supraclavicular nodes. Anterior mediastinal soft tissue with interspersed fat is favored to represent involuting/residual thymus. There is a 3.0 x 2.0 cm left breast mass, concerning for primary neoplasm. There is associated left breast skin thickening. Inferior right hepatic lobe lesions again identified, with peripheral discontinuous nodular enhancement, slightly increased since 2018, most consistent with hemangiomas. There is a 1.5 cm nodular intraluminal soft tissue density associated with the proximal transverse colon. Scattered lytic lesions of the bones are noted at T3 associated with superior endplate depression, T8, and T11 vertebral bodies. Right anterior third and anterolateral sixth rib probable pathologic fractures. There are additional L2-L4 vertebral body lytic lesions. Additionally, there is a right sacral mass which is extending into the sacroiliac joint and neural foramina, image 125 series 2 measuring 4.2 x 3.3 cm. Solid nodular component within the right sacral mass measures 2.6 cm. 1/13/22 Sonogram guided biopsy of the left breast revealed DCIS and invasive ductal carcinoma, moderately differentiated, ER/FL 90%, HER-2 negative (IHC 1+ membrane staining). Biopsy of left axilla positive for similar IDC. Biopsy of right breast at 9:00 revealed fibrocystic changes and usual ductal hyperplasia. 1/18/22 Genetic testing with a 19 gene panel revealed no pathogenic variants and a VUS in PTEN [id number] ([id number]) 1/21/22 Lupron 7.5 mg started and continued monthly until BSO 1/21/22 Xgeva 120 mg started and will continue every 3 months 02/03/22 Bone scan showed increased uptake in T3, punctate uptake in T7 on the right, focal uptake in L3 and right L4, c/w lytic lesions on CT. Increased uptake in right 6th rib c/w fracture noted on CT. Photopenic are in right sacrum c/w right sacral mass on CT. Additional focal uptake in the left parietal bone. 2/04/22 Letrozole and Ibrance started. Ibrance held for one week during radiation 2/16/22 - 02/23/22 Palliative radiation to the sacrum, 2000 cGy in 5 fractions and T2-4 with 2000 cGy in 5 fractions with Dr. Samantha Batres for severe pain 4/7/22 BSO showed benign fallopian tubes and ovaries. 4/2023 Anastrozole started in place of letrozole due to arthralgias with significant improvement. 7/5/23 Guardant 360 did not show any actionable mutations. [de-identified] : ~4 cm Invasive ductal carcinoma, SBR 6/9, +LVI, ER/MD 90%, HER-2 negative [de-identified] : CA 27-29 54.6 prior to treatment and then normalized, CEA normal [de-identified] : Jacy started Lupron 1/21/22 and then 2 weeks later started on Letrozole and Ibrance on 02/04/22. S/P BSO on 4/7/22.   She is currently on her week off Ibrance. Xgeva given in 10/24 and next due in 01/2025. In 04/2023, she transitioned from letrozole to anastrozole with improvement in her generalized arthralgia. Her right knee continues to be problematic.  She received a cortisone injection in her right knee in 6/23 and later had a course of viscous gel injections in 6/24 which helped. She still occasionally gets some discomfort with prolonged standing or walking. She is taking glucosamine chondroitin which has helped a bit. In 10/23 she developed right hip pain and had an MRI on 12/19/23 which showed a mild labral tear and tendinosis. The hot flashes have improved since the summer and are manageable. Her dermatologist prescribed Opzelura for her progressing Vitiligo, particularly on her face. She has hesitated to start the cream because it may potentially lower her WBC, and she is concerned since her WBC is already low from taking Ibrance. She has chronic GERD which is under control with Protonix and Pepcid. She can't take NSAIDs (including Celebrex) on a regular basis due to her stomach. She is on Lexapro 20 mg which has helped but she continues to struggle emotionally with her diagnosis and treatment effects but is doing better with time. She is  and has two children who are 9 and 10.5 (3rd and 5th grade). Currently, her children are unaware of her diagnosis.  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 She is adjusting and is starting to like this position more.  PCP: Rosie Myers MD Endocrinologist: Nelia Genao MD GYN: Milady Lamb MD  10/3/24 Bone scan: Persistent abnormalities in the skull, right sixth rib, and right sacroiliac region without significant interval change since the previous study of 5/15/2024. 10/03/24 CT scan C/A/P showed Stable exam compared to prior 5/15/2024. 5/15/24 CT scan C/A/P showed bony metastatic disease, not significantly changed from prior examination 2/20/2024. Left pelvic soft tissue density, probably enlarged lymph node, also not significantly changed. New healing fracture of the right anterior lateral sixth rib. 5/15/24 Bone scan showed interval increase in uptake in a right 6th rib lesion. Interval resolution of uptake in the right 4th and 5th ribs. 2/20/24 CT scan C/A/P showed enlarging left lower quadrant/pelvic soft tissue nodular density measuring 1.5 cm x 1.2 cm anterior to the left external iliac artery, series 2 image 140 may represent an enlarging lymph node versus mesenteric implant. Stable osseous metastasis. 02/20/24 Bone scan showed new punctate right rib lesions, which are most consistent with posttraumatic changes. Recommend interval short-term follow-up to assess for healing versus progression of disease. Bone metastases evident on prior exam are similar to or less apparent than on prior exam. 11/10/23 Bone scan showed no significant interval change compared to the previous bone scan of 7/24/2023. 11/10/23 CT scan showed no significant change. Stable presumed bone metastases. 7/24/23 Bone scan showed Interval improvement of lesions along the right femoral shaft and in the right hemisacrum. Otherwise grossly stable exam. 7/19/23 CT scan showed stable disease. 4/14/23 CT scan showed stable disease. 1/16/23 Bone scan showed stability with slight interval improvement in previously noted skeletal foci of disease. No new foci. 12/8/22 CT scan C/A/P showed stable interval examination with stable scattered bilateral small pulmonary nodules and no significant interval change in mixed lytic and sclerotic osseous metastases. Redemonstrated ill defined left breast mass with biopsy clip and associated left spiculated axillary lymph node with biopsy clip. 9/16/22 CT scan C/A/P showed the 3.7 cm right sacral mass is unchanged. Small sclerotic lesion in the left iliac wing is unchanged in size, more sclerotic on the current study. Mixed lytic and sclerotic osseous metastases are again noted, some of which demonstrate slightly increased peripheral sclerosis, likely reflecting posttreatment change. Previously noted tiny bilateral subcentimeter pulmonary nodules are largely unchanged. A previously referenced 1 to 2 mm right lower lobe pulmonary nodule is not definitively seen on the current exam however. 07/01/22 Bone scan showed tracer distribution of metastatic lesions is not significantly changed. Tracer activity is mostly less intense/unchanged except in the right sacrum which has more intense uptake. Small foci of increased uptake in the mid and distal right femur, more conspicuous than the previous study. Degenerative disease in major joints. 4/26/22 CT scan C/A/P Interval decrease in size of left breast mass and axillary adenopathy. Majority of tiny pulmonary nodules are no longer visualized, as described above. Several lytic bone metastases demonstrate increased sclerosis, suggesting healing. A 3.6 cm right sacral lytic mass is unchanged. Indeterminate nodular fold thickening within the proximal/mid transverse colon, less prominent, however correlation with colonoscopy is suggested. Other findings are unchanged, as described above. 02/03/22 Bone scan showed increased uptake in T3, punctate uptake in T7 on the right, focal uptake in L3 and right L4, c/w lytic lesions on CT. Increased uptake in right 6th rib c/w fracture noted on CT. Photopenic are in right sacrum c/w right sacral mass on CT. Additional focal uptake in the left parietal bone. 01/08/22 CT scan C/A/P: Few scattered subcentimeter pulmonary nodules up to 3 mm in size are identified. Left axillary pathologic adenopathy with lymph node conglomerate measuring 2.9 x 1.9 cm, similar to prior. Several additional borderline enlarged left-sided retropectoral nodes are identified. There is a left internal mammary node measuring 5 mm. Small volume right axillary nodes. Tiny supraclavicular nodes. Anterior mediastinal soft tissue with interspersed fat is favored to represent involuting/residual thymus. There is a 3.0 x 2.0 cm left breast mass, concerning for primary neoplasm. There is associated left breast skin thickening. Inferior right hepatic lobe lesions again identified, with peripheral discontinuous nodular enhancement, slightly increased since 2018, most consistent with hemangiomas. There is a 1.5 cm nodular intraluminal soft tissue density associated with the proximal transverse colon. Scattered lytic lesions of the bones are noted at T3 associated with superior endplate depression, T8, and T11 vertebral bodies. Right anterior third and anterolateral sixth rib probable pathologic fractures. There are additional L2-L4 vertebral body lytic lesions. Additionally, there is a right sacral mass which is extending into the sacroiliac joint and neural foramina, image 125 series 2 measuring 4.2 x 3.3 cm. Solid nodular component within the right sacral mass measures 2.6 cm.

## 2024-12-03 NOTE — PHYSICAL EXAM
[Fully active, able to carry on all pre-disease performance without restriction] : Status 0 - Fully active, able to carry on all pre-disease performance without restriction [Normal] : affect appropriate [de-identified] : Left breast mass ~3 x 3 cm. No palpable LN [de-identified] : hypopigmentation of on forehead and perioral region and nasal region.

## 2025-01-08 NOTE — PHYSICAL EXAM
[Fully active, able to carry on all pre-disease performance without restriction] : Status 0 - Fully active, able to carry on all pre-disease performance without restriction [Normal] : affect appropriate [de-identified] : Left breast mass ~3 x 3 cm. No palpable LN [de-identified] : hypopigmentation of on forehead and perioral region and nasal region.

## 2025-01-08 NOTE — HISTORY OF PRESENT ILLNESS
[Disease: _____________________] : Disease: [unfilled] [T: ___] : T[unfilled] [N: ___] : N[unfilled] [M: ___] : M[unfilled] [AJCC Stage: ____] : AJCC Stage: [unfilled] [de-identified] : Metastatic left breast cancer with bone metastases diagnosed at age 39. 12/26/21 CTA done when patient presented with right sided CP revealed no evidence of pulmonary embolism. Appearance of osseous metastases within the spine and ribs with associated pathologic fractures of the right third rib, right sixth rib and superior endplate of the T3 vertebral body. Subtle asymmetric skin thickening in the left breast and left axillary lymphadenopathy is suspicious for underlying left sided breast cancer. Left axillary lymphadenopathy is noted.  12/30/21 Mammogram and sonogram revealed a 7 x 5.6 x 6.5 asymmetry with nodularity, calcification and distortion in the left superior breast extending across the midline. Left axillary lymphadenopathy is noted. 01/08/22 CT scan C/A/P: Few scattered subcentimeter pulmonary nodules up to 3 mm in size are identified. Left axillary pathologic adenopathy with lymph node conglomerate measuring 2.9 x 1.9 cm, similar to prior. Several additional borderline enlarged left-sided retropectoral nodes are identified. There is a left internal mammary node measuring 5 mm. Small volume right axillary nodes. Tiny supraclavicular nodes. Anterior mediastinal soft tissue with interspersed fat is favored to represent involuting/residual thymus. There is a 3.0 x 2.0 cm left breast mass, concerning for primary neoplasm. There is associated left breast skin thickening. Inferior right hepatic lobe lesions again identified, with peripheral discontinuous nodular enhancement, slightly increased since 2018, most consistent with hemangiomas. There is a 1.5 cm nodular intraluminal soft tissue density associated with the proximal transverse colon. Scattered lytic lesions of the bones are noted at T3 associated with superior endplate depression, T8, and T11 vertebral bodies. Right anterior third and anterolateral sixth rib probable pathologic fractures. There are additional L2-L4 vertebral body lytic lesions. Additionally, there is a right sacral mass which is extending into the sacroiliac joint and neural foramina, image 125 series 2 measuring 4.2 x 3.3 cm. Solid nodular component within the right sacral mass measures 2.6 cm. 1/13/22 Sonogram guided biopsy of the left breast revealed DCIS and invasive ductal carcinoma, moderately differentiated, ER/SD 90%, HER-2 negative (IHC 1+ membrane staining). Biopsy of left axilla positive for similar IDC. Biopsy of right breast at 9:00 revealed fibrocystic changes and usual ductal hyperplasia. 1/18/22 Genetic testing with a 19 gene panel revealed no pathogenic variants and a VUS in PTEN c.828T>A ([id number]) 1/21/22 Lupron 7.5 mg started and continued monthly until BSO 1/21/22 Xgeva 120 mg started and will continue every 3 months 02/03/22 Bone scan showed increased uptake in T3, punctate uptake in T7 on the right, focal uptake in L3 and right L4, c/w lytic lesions on CT. Increased uptake in right 6th rib c/w fracture noted on CT. Photopenic are in right sacrum c/w right sacral mass on CT. Additional focal uptake in the left parietal bone. 2/04/22 Letrozole and Ibrance started. Ibrance held for one week during radiation 2/16/22 - 02/23/22 Palliative radiation to the sacrum, 2000 cGy in 5 fractions and T2-4 with 2000 cGy in 5 fractions with Dr. Samantha Batres for severe pain 4/7/22 BSO showed benign fallopian tubes and ovaries. 4/2023 Anastrozole started in place of letrozole due to arthralgias with significant improvement. 7/5/23 Guardant 360 did not show any actionable mutations. [de-identified] : ~4 cm Invasive ductal carcinoma, SBR 6/9, +LVI, ER/AR 90%, HER-2 negative [de-identified] : CA 27-29 54.6 prior to treatment and then normalized, CEA normal [de-identified] : Jacy started Lupron 1/21/22 and then 2 weeks later started on Letrozole and Ibrance on 02/04/22. S/P BSO on 4/7/22.   She is currently on her week off Ibrance. Xgeva given in 10/24 and next due in 02/2025. The Xgeva will be held due to dental issue and possible tooth extraction as per Dr. Santamaria patient's dentist. Discussed with Dr. Santamaria who mentioned that Jacy has Baby tooth ( primary tooth) that never fell out as she never developed permanent tooth. She is currently on clindamycin for 10 days and will be seeing her dentist for an evaluation and possible extraction which she knows to hold off until 5/2025 due to the Xgeva injections. In 04/2023, she transitioned from letrozole to anastrozole with improvement in her generalized arthralgia. She received a cortisone injection in her right knee in 6/23 and later had a course of viscous gel injections in 6/24 which helped. She still occasionally gets some discomfort with prolonged standing or walking. She is taking glucosamine chondroitin which has helped a bit. The hot flashes have improved since the summer and are manageable. Her dermatologist prescribed Opzelura for her progressing Vitiligo, particularly on her face. She has  started the cream but has not been compliant because it may potentially lower her WBC, and she is concerned since her WBC is already low from taking Ibrance. She has chronic GERD which is under control with Protonix and Pepcid. She can't take NSAIDs (including Celebrex) on a regular basis due to her stomach. She is on Lexapro 20 mg which has helped but she continues to struggle emotionally with her diagnosis and treatment effects but is doing better with time. She is  and has two children who are 9 and 10.5 (3rd and 5th grade). Currently, her children are unaware of her diagnosis.  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 She is adjusting and is starting to like this position more.  PCP: Rosie Myers MD Endocrinologist: Nelia Genao MD GYN: Milady Lamb MD  10/3/24 Bone scan: Persistent abnormalities in the skull, right sixth rib, and right sacroiliac region without significant interval change since the previous study of 5/15/2024. 10/03/24 CT scan C/A/P showed Stable exam compared to prior 5/15/2024. 5/15/24 CT scan C/A/P showed bony metastatic disease, not significantly changed from prior examination 2/20/2024. Left pelvic soft tissue density, probably enlarged lymph node, also not significantly changed. New healing fracture of the right anterior lateral sixth rib. 5/15/24 Bone scan showed interval increase in uptake in a right 6th rib lesion. Interval resolution of uptake in the right 4th and 5th ribs. 2/20/24 CT scan C/A/P showed enlarging left lower quadrant/pelvic soft tissue nodular density measuring 1.5 cm x 1.2 cm anterior to the left external iliac artery, series 2 image 140 may represent an enlarging lymph node versus mesenteric implant. Stable osseous metastasis. 02/20/24 Bone scan showed new punctate right rib lesions, which are most consistent with posttraumatic changes. Recommend interval short-term follow-up to assess for healing versus progression of disease. Bone metastases evident on prior exam are similar to or less apparent than on prior exam. 11/10/23 Bone scan showed no significant interval change compared to the previous bone scan of 7/24/2023. 11/10/23 CT scan showed no significant change. Stable presumed bone metastases. 7/24/23 Bone scan showed Interval improvement of lesions along the right femoral shaft and in the right hemisacrum. Otherwise grossly stable exam. 7/19/23 CT scan showed stable disease. 4/14/23 CT scan showed stable disease. 1/16/23 Bone scan showed stability with slight interval improvement in previously noted skeletal foci of disease. No new foci. 12/8/22 CT scan C/A/P showed stable interval examination with stable scattered bilateral small pulmonary nodules and no significant interval change in mixed lytic and sclerotic osseous metastases. Redemonstrated ill defined left breast mass with biopsy clip and associated left spiculated axillary lymph node with biopsy clip. 9/16/22 CT scan C/A/P showed the 3.7 cm right sacral mass is unchanged. Small sclerotic lesion in the left iliac wing is unchanged in size, more sclerotic on the current study. Mixed lytic and sclerotic osseous metastases are again noted, some of which demonstrate slightly increased peripheral sclerosis, likely reflecting posttreatment change. Previously noted tiny bilateral subcentimeter pulmonary nodules are largely unchanged. A previously referenced 1 to 2 mm right lower lobe pulmonary nodule is not definitively seen on the current exam however. 07/01/22 Bone scan showed tracer distribution of metastatic lesions is not significantly changed. Tracer activity is mostly less intense/unchanged except in the right sacrum which has more intense uptake. Small foci of increased uptake in the mid and distal right femur, more conspicuous than the previous study. Degenerative disease in major joints. 4/26/22 CT scan C/A/P Interval decrease in size of left breast mass and axillary adenopathy. Majority of tiny pulmonary nodules are no longer visualized, as described above. Several lytic bone metastases demonstrate increased sclerosis, suggesting healing. A 3.6 cm right sacral lytic mass is unchanged. Indeterminate nodular fold thickening within the proximal/mid transverse colon, less prominent, however correlation with colonoscopy is suggested. Other findings are unchanged, as described above. 02/03/22 Bone scan showed increased uptake in T3, punctate uptake in T7 on the right, focal uptake in L3 and right L4, c/w lytic lesions on CT. Increased uptake in right 6th rib c/w fracture noted on CT. Photopenic are in right sacrum c/w right sacral mass on CT. Additional focal uptake in the left parietal bone. 01/08/22 CT scan C/A/P: Few scattered subcentimeter pulmonary nodules up to 3 mm in size are identified. Left axillary pathologic adenopathy with lymph node conglomerate measuring 2.9 x 1.9 cm, similar to prior. Several additional borderline enlarged left-sided retropectoral nodes are identified. There is a left internal mammary node measuring 5 mm. Small volume right axillary nodes. Tiny supraclavicular nodes. Anterior mediastinal soft tissue with interspersed fat is favored to represent involuting/residual thymus. There is a 3.0 x 2.0 cm left breast mass, concerning for primary neoplasm. There is associated left breast skin thickening. Inferior right hepatic lobe lesions again identified, with peripheral discontinuous nodular enhancement, slightly increased since 2018, most consistent with hemangiomas. There is a 1.5 cm nodular intraluminal soft tissue density associated with the proximal transverse colon. Scattered lytic lesions of the bones are noted at T3 associated with superior endplate depression, T8, and T11 vertebral bodies. Right anterior third and anterolateral sixth rib probable pathologic fractures. There are additional L2-L4 vertebral body lytic lesions. Additionally, there is a right sacral mass which is extending into the sacroiliac joint and neural foramina, image 125 series 2 measuring 4.2 x 3.3 cm. Solid nodular component within the right sacral mass measures 2.6 cm.

## 2025-01-08 NOTE — HISTORY OF PRESENT ILLNESS
[Disease: _____________________] : Disease: [unfilled] [T: ___] : T[unfilled] [N: ___] : N[unfilled] [M: ___] : M[unfilled] [AJCC Stage: ____] : AJCC Stage: [unfilled] [de-identified] : Metastatic left breast cancer with bone metastases diagnosed at age 39. 12/26/21 CTA done when patient presented with right sided CP revealed no evidence of pulmonary embolism. Appearance of osseous metastases within the spine and ribs with associated pathologic fractures of the right third rib, right sixth rib and superior endplate of the T3 vertebral body. Subtle asymmetric skin thickening in the left breast and left axillary lymphadenopathy is suspicious for underlying left sided breast cancer. Left axillary lymphadenopathy is noted.  12/30/21 Mammogram and sonogram revealed a 7 x 5.6 x 6.5 asymmetry with nodularity, calcification and distortion in the left superior breast extending across the midline. Left axillary lymphadenopathy is noted. 01/08/22 CT scan C/A/P: Few scattered subcentimeter pulmonary nodules up to 3 mm in size are identified. Left axillary pathologic adenopathy with lymph node conglomerate measuring 2.9 x 1.9 cm, similar to prior. Several additional borderline enlarged left-sided retropectoral nodes are identified. There is a left internal mammary node measuring 5 mm. Small volume right axillary nodes. Tiny supraclavicular nodes. Anterior mediastinal soft tissue with interspersed fat is favored to represent involuting/residual thymus. There is a 3.0 x 2.0 cm left breast mass, concerning for primary neoplasm. There is associated left breast skin thickening. Inferior right hepatic lobe lesions again identified, with peripheral discontinuous nodular enhancement, slightly increased since 2018, most consistent with hemangiomas. There is a 1.5 cm nodular intraluminal soft tissue density associated with the proximal transverse colon. Scattered lytic lesions of the bones are noted at T3 associated with superior endplate depression, T8, and T11 vertebral bodies. Right anterior third and anterolateral sixth rib probable pathologic fractures. There are additional L2-L4 vertebral body lytic lesions. Additionally, there is a right sacral mass which is extending into the sacroiliac joint and neural foramina, image 125 series 2 measuring 4.2 x 3.3 cm. Solid nodular component within the right sacral mass measures 2.6 cm. 1/13/22 Sonogram guided biopsy of the left breast revealed DCIS and invasive ductal carcinoma, moderately differentiated, ER/OK 90%, HER-2 negative (IHC 1+ membrane staining). Biopsy of left axilla positive for similar IDC. Biopsy of right breast at 9:00 revealed fibrocystic changes and usual ductal hyperplasia. 1/18/22 Genetic testing with a 19 gene panel revealed no pathogenic variants and a VUS in PTEN c.828T>A ([id number]) 1/21/22 Lupron 7.5 mg started and continued monthly until BSO 1/21/22 Xgeva 120 mg started and will continue every 3 months 02/03/22 Bone scan showed increased uptake in T3, punctate uptake in T7 on the right, focal uptake in L3 and right L4, c/w lytic lesions on CT. Increased uptake in right 6th rib c/w fracture noted on CT. Photopenic are in right sacrum c/w right sacral mass on CT. Additional focal uptake in the left parietal bone. 2/04/22 Letrozole and Ibrance started. Ibrance held for one week during radiation 2/16/22 - 02/23/22 Palliative radiation to the sacrum, 2000 cGy in 5 fractions and T2-4 with 2000 cGy in 5 fractions with Dr. Samantha Batres for severe pain 4/7/22 BSO showed benign fallopian tubes and ovaries. 4/2023 Anastrozole started in place of letrozole due to arthralgias with significant improvement. 7/5/23 Guardant 360 did not show any actionable mutations. [de-identified] : ~4 cm Invasive ductal carcinoma, SBR 6/9, +LVI, ER/OR 90%, HER-2 negative [de-identified] : CA 27-29 54.6 prior to treatment and then normalized, CEA normal [de-identified] : Jacy started Lupron 1/21/22 and then 2 weeks later started on Letrozole and Ibrance on 02/04/22. S/P BSO on 4/7/22.   She is currently on her week off Ibrance. Xgeva given in 10/24 and next due in 02/2025. The Xgeva will be held due to dental issue and possible tooth extraction as per Dr. Santamaria patient's dentist. Discussed with Dr. Santamaria who mentioned that Jacy has Baby tooth ( primary tooth) that never fell out as she never developed permanent tooth. She is currently on clindamycin for 10 days and will be seeing her dentist for an evaluation and possible extraction which she knows to hold off until 5/2025 due to the Xgeva injections. In 04/2023, she transitioned from letrozole to anastrozole with improvement in her generalized arthralgia. She received a cortisone injection in her right knee in 6/23 and later had a course of viscous gel injections in 6/24 which helped. She still occasionally gets some discomfort with prolonged standing or walking. She is taking glucosamine chondroitin which has helped a bit. The hot flashes have improved since the summer and are manageable. Her dermatologist prescribed Opzelura for her progressing Vitiligo, particularly on her face. She has  started the cream but has not been compliant because it may potentially lower her WBC, and she is concerned since her WBC is already low from taking Ibrance. She has chronic GERD which is under control with Protonix and Pepcid. She can't take NSAIDs (including Celebrex) on a regular basis due to her stomach. She is on Lexapro 20 mg which has helped but she continues to struggle emotionally with her diagnosis and treatment effects but is doing better with time. She is  and has two children who are 9 and 10.5 (3rd and 5th grade). Currently, her children are unaware of her diagnosis.  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 She is adjusting and is starting to like this position more.  PCP: Rosie Myers MD Endocrinologist: Nelia Genao MD GYN: Milady Lamb MD  10/3/24 Bone scan: Persistent abnormalities in the skull, right sixth rib, and right sacroiliac region without significant interval change since the previous study of 5/15/2024. 10/03/24 CT scan C/A/P showed Stable exam compared to prior 5/15/2024. 5/15/24 CT scan C/A/P showed bony metastatic disease, not significantly changed from prior examination 2/20/2024. Left pelvic soft tissue density, probably enlarged lymph node, also not significantly changed. New healing fracture of the right anterior lateral sixth rib. 5/15/24 Bone scan showed interval increase in uptake in a right 6th rib lesion. Interval resolution of uptake in the right 4th and 5th ribs. 2/20/24 CT scan C/A/P showed enlarging left lower quadrant/pelvic soft tissue nodular density measuring 1.5 cm x 1.2 cm anterior to the left external iliac artery, series 2 image 140 may represent an enlarging lymph node versus mesenteric implant. Stable osseous metastasis. 02/20/24 Bone scan showed new punctate right rib lesions, which are most consistent with posttraumatic changes. Recommend interval short-term follow-up to assess for healing versus progression of disease. Bone metastases evident on prior exam are similar to or less apparent than on prior exam. 11/10/23 Bone scan showed no significant interval change compared to the previous bone scan of 7/24/2023. 11/10/23 CT scan showed no significant change. Stable presumed bone metastases. 7/24/23 Bone scan showed Interval improvement of lesions along the right femoral shaft and in the right hemisacrum. Otherwise grossly stable exam. 7/19/23 CT scan showed stable disease. 4/14/23 CT scan showed stable disease. 1/16/23 Bone scan showed stability with slight interval improvement in previously noted skeletal foci of disease. No new foci. 12/8/22 CT scan C/A/P showed stable interval examination with stable scattered bilateral small pulmonary nodules and no significant interval change in mixed lytic and sclerotic osseous metastases. Redemonstrated ill defined left breast mass with biopsy clip and associated left spiculated axillary lymph node with biopsy clip. 9/16/22 CT scan C/A/P showed the 3.7 cm right sacral mass is unchanged. Small sclerotic lesion in the left iliac wing is unchanged in size, more sclerotic on the current study. Mixed lytic and sclerotic osseous metastases are again noted, some of which demonstrate slightly increased peripheral sclerosis, likely reflecting posttreatment change. Previously noted tiny bilateral subcentimeter pulmonary nodules are largely unchanged. A previously referenced 1 to 2 mm right lower lobe pulmonary nodule is not definitively seen on the current exam however. 07/01/22 Bone scan showed tracer distribution of metastatic lesions is not significantly changed. Tracer activity is mostly less intense/unchanged except in the right sacrum which has more intense uptake. Small foci of increased uptake in the mid and distal right femur, more conspicuous than the previous study. Degenerative disease in major joints. 4/26/22 CT scan C/A/P Interval decrease in size of left breast mass and axillary adenopathy. Majority of tiny pulmonary nodules are no longer visualized, as described above. Several lytic bone metastases demonstrate increased sclerosis, suggesting healing. A 3.6 cm right sacral lytic mass is unchanged. Indeterminate nodular fold thickening within the proximal/mid transverse colon, less prominent, however correlation with colonoscopy is suggested. Other findings are unchanged, as described above. 02/03/22 Bone scan showed increased uptake in T3, punctate uptake in T7 on the right, focal uptake in L3 and right L4, c/w lytic lesions on CT. Increased uptake in right 6th rib c/w fracture noted on CT. Photopenic are in right sacrum c/w right sacral mass on CT. Additional focal uptake in the left parietal bone. 01/08/22 CT scan C/A/P: Few scattered subcentimeter pulmonary nodules up to 3 mm in size are identified. Left axillary pathologic adenopathy with lymph node conglomerate measuring 2.9 x 1.9 cm, similar to prior. Several additional borderline enlarged left-sided retropectoral nodes are identified. There is a left internal mammary node measuring 5 mm. Small volume right axillary nodes. Tiny supraclavicular nodes. Anterior mediastinal soft tissue with interspersed fat is favored to represent involuting/residual thymus. There is a 3.0 x 2.0 cm left breast mass, concerning for primary neoplasm. There is associated left breast skin thickening. Inferior right hepatic lobe lesions again identified, with peripheral discontinuous nodular enhancement, slightly increased since 2018, most consistent with hemangiomas. There is a 1.5 cm nodular intraluminal soft tissue density associated with the proximal transverse colon. Scattered lytic lesions of the bones are noted at T3 associated with superior endplate depression, T8, and T11 vertebral bodies. Right anterior third and anterolateral sixth rib probable pathologic fractures. There are additional L2-L4 vertebral body lytic lesions. Additionally, there is a right sacral mass which is extending into the sacroiliac joint and neural foramina, image 125 series 2 measuring 4.2 x 3.3 cm. Solid nodular component within the right sacral mass measures 2.6 cm.

## 2025-01-08 NOTE — PHYSICAL EXAM
[Fully active, able to carry on all pre-disease performance without restriction] : Status 0 - Fully active, able to carry on all pre-disease performance without restriction [Normal] : affect appropriate [de-identified] : Left breast mass ~3 x 3 cm. No palpable LN [de-identified] : hypopigmentation of on forehead and perioral region and nasal region.

## 2025-01-08 NOTE — PHYSICAL EXAM
[Fully active, able to carry on all pre-disease performance without restriction] : Status 0 - Fully active, able to carry on all pre-disease performance without restriction [Normal] : affect appropriate [de-identified] : Left breast mass ~3 x 3 cm. No palpable LN [de-identified] : hypopigmentation of on forehead and perioral region and nasal region.

## 2025-01-08 NOTE — HISTORY OF PRESENT ILLNESS
[Disease: _____________________] : Disease: [unfilled] [T: ___] : T[unfilled] [N: ___] : N[unfilled] [M: ___] : M[unfilled] [AJCC Stage: ____] : AJCC Stage: [unfilled] [de-identified] : Metastatic left breast cancer with bone metastases diagnosed at age 39. 12/26/21 CTA done when patient presented with right sided CP revealed no evidence of pulmonary embolism. Appearance of osseous metastases within the spine and ribs with associated pathologic fractures of the right third rib, right sixth rib and superior endplate of the T3 vertebral body. Subtle asymmetric skin thickening in the left breast and left axillary lymphadenopathy is suspicious for underlying left sided breast cancer. Left axillary lymphadenopathy is noted.  12/30/21 Mammogram and sonogram revealed a 7 x 5.6 x 6.5 asymmetry with nodularity, calcification and distortion in the left superior breast extending across the midline. Left axillary lymphadenopathy is noted. 01/08/22 CT scan C/A/P: Few scattered subcentimeter pulmonary nodules up to 3 mm in size are identified. Left axillary pathologic adenopathy with lymph node conglomerate measuring 2.9 x 1.9 cm, similar to prior. Several additional borderline enlarged left-sided retropectoral nodes are identified. There is a left internal mammary node measuring 5 mm. Small volume right axillary nodes. Tiny supraclavicular nodes. Anterior mediastinal soft tissue with interspersed fat is favored to represent involuting/residual thymus. There is a 3.0 x 2.0 cm left breast mass, concerning for primary neoplasm. There is associated left breast skin thickening. Inferior right hepatic lobe lesions again identified, with peripheral discontinuous nodular enhancement, slightly increased since 2018, most consistent with hemangiomas. There is a 1.5 cm nodular intraluminal soft tissue density associated with the proximal transverse colon. Scattered lytic lesions of the bones are noted at T3 associated with superior endplate depression, T8, and T11 vertebral bodies. Right anterior third and anterolateral sixth rib probable pathologic fractures. There are additional L2-L4 vertebral body lytic lesions. Additionally, there is a right sacral mass which is extending into the sacroiliac joint and neural foramina, image 125 series 2 measuring 4.2 x 3.3 cm. Solid nodular component within the right sacral mass measures 2.6 cm. 1/13/22 Sonogram guided biopsy of the left breast revealed DCIS and invasive ductal carcinoma, moderately differentiated, ER/[place] 90%, HER-2 negative (IHC 1+ membrane staining). Biopsy of left axilla positive for similar IDC. Biopsy of right breast at 9:00 revealed fibrocystic changes and usual ductal hyperplasia. 1/18/22 Genetic testing with a 19 gene panel revealed no pathogenic variants and a VUS in PTEN c.828T>A ([id number]) 1/21/22 Lupron 7.5 mg started and continued monthly until BSO 1/21/22 Xgeva 120 mg started and will continue every 3 months 02/03/22 Bone scan showed increased uptake in T3, punctate uptake in T7 on the right, focal uptake in L3 and right L4, c/w lytic lesions on CT. Increased uptake in right 6th rib c/w fracture noted on CT. Photopenic are in right sacrum c/w right sacral mass on CT. Additional focal uptake in the left parietal bone. 2/04/22 Letrozole and Ibrance started. Ibrance held for one week during radiation 2/16/22 - 02/23/22 Palliative radiation to the sacrum, 2000 cGy in 5 fractions and T2-4 with 2000 cGy in 5 fractions with Dr. Samantha Batres for severe pain 4/7/22 BSO showed benign fallopian tubes and ovaries. 4/2023 Anastrozole started in place of letrozole due to arthralgias with significant improvement. 7/5/23 Guardant 360 did not show any actionable mutations. [de-identified] : ~4 cm Invasive ductal carcinoma, SBR 6/9, +LVI, ER/[place] 90%, HER-2 negative [de-identified] : CA 27-29 54.6 prior to treatment and then normalized, CEA normal [de-identified] : Jacy started Lupron 1/21/22 and then 2 weeks later started on Letrozole and Ibrance on 02/04/22. S/P BSO on 4/7/22.   She is currently on her week off Ibrance. Xgeva given in 10/24 and next due in 02/2025. The Xgeva will be held due to dental issue and possible tooth extraction as per Dr. Santamaria patient's dentist. Discussed with Dr. Santamaria who mentioned that Jacy has Baby tooth ( primary tooth) that never fell out as she never developed permanent tooth. She is currently on clindamycin for 10 days and will be seeing her dentist for an evaluation and possible extraction which she knows to hold off until 5/2025 due to the Xgeva injections. In 04/2023, she transitioned from letrozole to anastrozole with improvement in her generalized arthralgia. She received a cortisone injection in her right knee in 6/23 and later had a course of viscous gel injections in 6/24 which helped. She still occasionally gets some discomfort with prolonged standing or walking. She is taking glucosamine chondroitin which has helped a bit. The hot flashes have improved since the summer and are manageable. Her dermatologist prescribed Opzelura for her progressing Vitiligo, particularly on her face. She has  started the cream but has not been compliant because it may potentially lower her WBC, and she is concerned since her WBC is already low from taking Ibrance. She has chronic GERD which is under control with Protonix and Pepcid. She can't take NSAIDs (including Celebrex) on a regular basis due to her stomach. She is on Lexapro 20 mg which has helped but she continues to struggle emotionally with her diagnosis and treatment effects but is doing better with time. She is  and has two children who are 9 and 10.5 (3rd and 5th grade). Currently, her children are unaware of her diagnosis.  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 She is adjusting and is starting to like this position more.  PCP: Rosie Myers MD Endocrinologist: Nelia Genao MD GYN: Milady Lamb MD  10/3/24 Bone scan: Persistent abnormalities in the skull, right sixth rib, and right sacroiliac region without significant interval change since the previous study of 5/15/2024. 10/03/24 CT scan C/A/P showed Stable exam compared to prior 5/15/2024. 5/15/24 CT scan C/A/P showed bony metastatic disease, not significantly changed from prior examination 2/20/2024. Left pelvic soft tissue density, probably enlarged lymph node, also not significantly changed. New healing fracture of the right anterior lateral sixth rib. 5/15/24 Bone scan showed interval increase in uptake in a right 6th rib lesion. Interval resolution of uptake in the right 4th and 5th ribs. 2/20/24 CT scan C/A/P showed enlarging left lower quadrant/pelvic soft tissue nodular density measuring 1.5 cm x 1.2 cm anterior to the left external iliac artery, series 2 image 140 may represent an enlarging lymph node versus mesenteric implant. Stable osseous metastasis. 02/20/24 Bone scan showed new punctate right rib lesions, which are most consistent with posttraumatic changes. Recommend interval short-term follow-up to assess for healing versus progression of disease. Bone metastases evident on prior exam are similar to or less apparent than on prior exam. 11/10/23 Bone scan showed no significant interval change compared to the previous bone scan of 7/24/2023. 11/10/23 CT scan showed no significant change. Stable presumed bone metastases. 7/24/23 Bone scan showed Interval improvement of lesions along the right femoral shaft and in the right hemisacrum. Otherwise grossly stable exam. 7/19/23 CT scan showed stable disease. 4/14/23 CT scan showed stable disease. 1/16/23 Bone scan showed stability with slight interval improvement in previously noted skeletal foci of disease. No new foci. 12/8/22 CT scan C/A/P showed stable interval examination with stable scattered bilateral small pulmonary nodules and no significant interval change in mixed lytic and sclerotic osseous metastases. Redemonstrated ill defined left breast mass with biopsy clip and associated left spiculated axillary lymph node with biopsy clip. 9/16/22 CT scan C/A/P showed the 3.7 cm right sacral mass is unchanged. Small sclerotic lesion in the left iliac wing is unchanged in size, more sclerotic on the current study. Mixed lytic and sclerotic osseous metastases are again noted, some of which demonstrate slightly increased peripheral sclerosis, likely reflecting posttreatment change. Previously noted tiny bilateral subcentimeter pulmonary nodules are largely unchanged. A previously referenced 1 to 2 mm right lower lobe pulmonary nodule is not definitively seen on the current exam however. 07/01/22 Bone scan showed tracer distribution of metastatic lesions is not significantly changed. Tracer activity is mostly less intense/unchanged except in the right sacrum which has more intense uptake. Small foci of increased uptake in the mid and distal right femur, more conspicuous than the previous study. Degenerative disease in major joints. 4/26/22 CT scan C/A/P Interval decrease in size of left breast mass and axillary adenopathy. Majority of tiny pulmonary nodules are no longer visualized, as described above. Several lytic bone metastases demonstrate increased sclerosis, suggesting healing. A 3.6 cm right sacral lytic mass is unchanged. Indeterminate nodular fold thickening within the proximal/mid transverse colon, less prominent, however correlation with colonoscopy is suggested. Other findings are unchanged, as described above. 02/03/22 Bone scan showed increased uptake in T3, punctate uptake in T7 on the right, focal uptake in L3 and right L4, c/w lytic lesions on CT. Increased uptake in right 6th rib c/w fracture noted on CT. Photopenic are in right sacrum c/w right sacral mass on CT. Additional focal uptake in the left parietal bone. 01/08/22 CT scan C/A/P: Few scattered subcentimeter pulmonary nodules up to 3 mm in size are identified. Left axillary pathologic adenopathy with lymph node conglomerate measuring 2.9 x 1.9 cm, similar to prior. Several additional borderline enlarged left-sided retropectoral nodes are identified. There is a left internal mammary node measuring 5 mm. Small volume right axillary nodes. Tiny supraclavicular nodes. Anterior mediastinal soft tissue with interspersed fat is favored to represent involuting/residual thymus. There is a 3.0 x 2.0 cm left breast mass, concerning for primary neoplasm. There is associated left breast skin thickening. Inferior right hepatic lobe lesions again identified, with peripheral discontinuous nodular enhancement, slightly increased since 2018, most consistent with hemangiomas. There is a 1.5 cm nodular intraluminal soft tissue density associated with the proximal transverse colon. Scattered lytic lesions of the bones are noted at T3 associated with superior endplate depression, T8, and T11 vertebral bodies. Right anterior third and anterolateral sixth rib probable pathologic fractures. There are additional L2-L4 vertebral body lytic lesions. Additionally, there is a right sacral mass which is extending into the sacroiliac joint and neural foramina, image 125 series 2 measuring 4.2 x 3.3 cm. Solid nodular component within the right sacral mass measures 2.6 cm.

## 2025-02-05 NOTE — PHYSICAL EXAM
[Fully active, able to carry on all pre-disease performance without restriction] : Status 0 - Fully active, able to carry on all pre-disease performance without restriction [Normal] : affect appropriate [de-identified] : Left breast mass ~3 x 3 cm. No palpable LN [de-identified] : hypopigmentation of on forehead and perioral region and nasal region.

## 2025-02-05 NOTE — PHYSICAL EXAM
[Fully active, able to carry on all pre-disease performance without restriction] : Status 0 - Fully active, able to carry on all pre-disease performance without restriction [Normal] : affect appropriate [de-identified] : Left breast mass ~3 x 3 cm. No palpable LN [de-identified] : hypopigmentation of on forehead and perioral region and nasal region.

## 2025-02-05 NOTE — HISTORY OF PRESENT ILLNESS
[Disease: _____________________] : Disease: [unfilled] [T: ___] : T[unfilled] [N: ___] : N[unfilled] [M: ___] : M[unfilled] [AJCC Stage: ____] : AJCC Stage: [unfilled] [de-identified] : Metastatic left breast cancer with bone metastases diagnosed at age 39. 12/26/21 CTA done when patient presented with right sided CP revealed no evidence of pulmonary embolism. Appearance of osseous metastases within the spine and ribs with associated pathologic fractures of the right third rib, right sixth rib and superior endplate of the T3 vertebral body. Subtle asymmetric skin thickening in the left breast and left axillary lymphadenopathy is suspicious for underlying left sided breast cancer. Left axillary lymphadenopathy is noted.  12/30/21 Mammogram and sonogram revealed a 7 x 5.6 x 6.5 asymmetry with nodularity, calcification and distortion in the left superior breast extending across the midline. Left axillary lymphadenopathy is noted. 01/08/22 CT scan C/A/P: Few scattered subcentimeter pulmonary nodules up to 3 mm in size are identified. Left axillary pathologic adenopathy with lymph node conglomerate measuring 2.9 x 1.9 cm, similar to prior. Several additional borderline enlarged left-sided retropectoral nodes are identified. There is a left internal mammary node measuring 5 mm. Small volume right axillary nodes. Tiny supraclavicular nodes. Anterior mediastinal soft tissue with interspersed fat is favored to represent involuting/residual thymus. There is a 3.0 x 2.0 cm left breast mass, concerning for primary neoplasm. There is associated left breast skin thickening. Inferior right hepatic lobe lesions again identified, with peripheral discontinuous nodular enhancement, slightly increased since 2018, most consistent with hemangiomas. There is a 1.5 cm nodular intraluminal soft tissue density associated with the proximal transverse colon. Scattered lytic lesions of the bones are noted at T3 associated with superior endplate depression, T8, and T11 vertebral bodies. Right anterior third and anterolateral sixth rib probable pathologic fractures. There are additional L2-L4 vertebral body lytic lesions. Additionally, there is a right sacral mass which is extending into the sacroiliac joint and neural foramina, image 125 series 2 measuring 4.2 x 3.3 cm. Solid nodular component within the right sacral mass measures 2.6 cm. 1/13/22 Sonogram guided biopsy of the left breast revealed DCIS and invasive ductal carcinoma, moderately differentiated, ER/[place] 90%, HER-2 negative (IHC 1+ membrane staining). Biopsy of left axilla positive for similar IDC. Biopsy of right breast at 9:00 revealed fibrocystic changes and usual ductal hyperplasia. 1/18/22 Genetic testing with a 19 gene panel revealed no pathogenic variants and a VUS in PTEN c.828T>A ([id number]) 1/21/22 Lupron 7.5 mg started and continued monthly until BSO 1/21/22 Xgeva 120 mg started and will continue every 3 months 02/03/22 Bone scan showed increased uptake in T3, punctate uptake in T7 on the right, focal uptake in L3 and right L4, c/w lytic lesions on CT. Increased uptake in right 6th rib c/w fracture noted on CT. Photopenic are in right sacrum c/w right sacral mass on CT. Additional focal uptake in the left parietal bone. 2/04/22 Letrozole and Ibrance started. Ibrance held for one week during radiation 2/16/22 - 02/23/22 Palliative radiation to the sacrum, 2000 cGy in 5 fractions and T2-4 with 2000 cGy in 5 fractions with Dr. Samantha Batres for severe pain 4/7/22 BSO showed benign fallopian tubes and ovaries. 4/2023 Anastrozole started in place of letrozole due to arthralgias with significant improvement. 7/5/23 Guardant 360 did not show any actionable mutations. [de-identified] : ~4 cm Invasive ductal carcinoma, SBR 6/9, +LVI, ER/[place] 90%, HER-2 negative [de-identified] : CA 27-29 54.6 prior to treatment and then normalized, CEA normal [de-identified] : Jacy started Lupron 1/21/22 and then 2 weeks later started on Letrozole and Ibrance on 02/04/22. S/P BSO on 4/7/22.   She is currently on her week off Ibrance. Xgeva given in 10/24 and next due in today. The Xgeva was going to be held this month due to dental issue and possible tooth extraction as per Dr. Santamaria patient's dentist. Discussed with Dr. Santamaria who mentioned that Jacy has Baby tooth (primary tooth) that never fell out as she never developed permanent tooth. She completed clindamycin for 10 days, which seems to have resolved the pain. According to the patient she was told that the tooth has not root and being held by the gums she does not need immediate extraction of the tooth, instead he is planning to put in a filling and continue to monitor as the patient doesn't want any interruptions in her therapy.  Patient knows that in the future the Xgeva would need to be held for 6 months prior to any extractions In 04/2023, she transitioned from letrozole to anastrozole with improvement in her generalized arthralgia.  She received a cortisone injection in her right knee in 6/23 and later had a course of viscous gel injections in 6/24 which helped. However, in the past few weeks she has been noticing a little more stiffness and arthralgia in her knees and both her hips. She reports being more active and trying to lose weight and believe that may also have contributed to the aches and pain in her LE. She no longer gets hot flashes but continues to notice some perspiration of the forehead when she is feels warm Her dermatologist prescribed Opzelura for her progressing Vitiligo, particularly on her face. She has  started the cream but has not been compliant because it may potentially lower her WBC, and she is concerned since her WBC is already low from taking Ibrance. She has chronic GERD which is under control with Protonix and Pepcid. She can't take NSAIDs (including Celebrex) on a regular basis due to her stomach. She is on Lexapro 20 mg which has helped but she continues to struggle emotionally with her diagnosis and treatment effects but is doing better with time. She is  and has two children who are 9 and 10.5 (3rd and 5th grade). Currently, her children are unaware of her diagnosis.  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 She is adjusting and is starting to like this position more.  PCP: Rosie Myers MD Endocrinologist: Nelia Genao MD GYN: Milady Lamb MD 2/18/25 Bone scan: 2/18/25 CT scan: 10/3/24 Bone scan: Persistent abnormalities in the skull, right sixth rib, and right sacroiliac region without significant interval change since the previous study of 5/15/2024. 10/03/24 CT scan C/A/P showed Stable exam compared to prior 5/15/2024. 5/15/24 CT scan C/A/P showed bony metastatic disease, not significantly changed from prior examination 2/20/2024. Left pelvic soft tissue density, probably enlarged lymph node, also not significantly changed. New healing fracture of the right anterior lateral sixth rib. 5/15/24 Bone scan showed interval increase in uptake in a right 6th rib lesion. Interval resolution of uptake in the right 4th and 5th ribs. 2/20/24 CT scan C/A/P showed enlarging left lower quadrant/pelvic soft tissue nodular density measuring 1.5 cm x 1.2 cm anterior to the left external iliac artery, series 2 image 140 may represent an enlarging lymph node versus mesenteric implant. Stable osseous metastasis. 02/20/24 Bone scan showed new punctate right rib lesions, which are most consistent with posttraumatic changes. Recommend interval short-term follow-up to assess for healing versus progression of disease. Bone metastases evident on prior exam are similar to or less apparent than on prior exam. 11/10/23 Bone scan showed no significant interval change compared to the previous bone scan of 7/24/2023. 11/10/23 CT scan showed no significant change. Stable presumed bone metastases. 7/24/23 Bone scan showed Interval improvement of lesions along the right femoral shaft and in the right hemisacrum. Otherwise grossly stable exam. 7/19/23 CT scan showed stable disease. 4/14/23 CT scan showed stable disease. 1/16/23 Bone scan showed stability with slight interval improvement in previously noted skeletal foci of disease. No new foci. 12/8/22 CT scan C/A/P showed stable interval examination with stable scattered bilateral small pulmonary nodules and no significant interval change in mixed lytic and sclerotic osseous metastases. Redemonstrated ill defined left breast mass with biopsy clip and associated left spiculated axillary lymph node with biopsy clip. 9/16/22 CT scan C/A/P showed the 3.7 cm right sacral mass is unchanged. Small sclerotic lesion in the left iliac wing is unchanged in size, more sclerotic on the current study. Mixed lytic and sclerotic osseous metastases are again noted, some of which demonstrate slightly increased peripheral sclerosis, likely reflecting posttreatment change. Previously noted tiny bilateral subcentimeter pulmonary nodules are largely unchanged. A previously referenced 1 to 2 mm right lower lobe pulmonary nodule is not definitively seen on the current exam however. 07/01/22 Bone scan showed tracer distribution of metastatic lesions is not significantly changed. Tracer activity is mostly less intense/unchanged except in the right sacrum which has more intense uptake. Small foci of increased uptake in the mid and distal right femur, more conspicuous than the previous study. Degenerative disease in major joints. 4/26/22 CT scan C/A/P Interval decrease in size of left breast mass and axillary adenopathy. Majority of tiny pulmonary nodules are no longer visualized, as described above. Several lytic bone metastases demonstrate increased sclerosis, suggesting healing. A 3.6 cm right sacral lytic mass is unchanged. Indeterminate nodular fold thickening within the proximal/mid transverse colon, less prominent, however correlation with colonoscopy is suggested. Other findings are unchanged, as described above. 02/03/22 Bone scan showed increased uptake in T3, punctate uptake in T7 on the right, focal uptake in L3 and right L4, c/w lytic lesions on CT. Increased uptake in right 6th rib c/w fracture noted on CT. Photopenic are in right sacrum c/w right sacral mass on CT. Additional focal uptake in the left parietal bone. 01/08/22 CT scan C/A/P: Few scattered subcentimeter pulmonary nodules up to 3 mm in size are identified. Left axillary pathologic adenopathy with lymph node conglomerate measuring 2.9 x 1.9 cm, similar to prior. Several additional borderline enlarged left-sided retropectoral nodes are identified. There is a left internal mammary node measuring 5 mm. Small volume right axillary nodes. Tiny supraclavicular nodes. Anterior mediastinal soft tissue with interspersed fat is favored to represent involuting/residual thymus. There is a 3.0 x 2.0 cm left breast mass, concerning for primary neoplasm. There is associated left breast skin thickening. Inferior right hepatic lobe lesions again identified, with peripheral discontinuous nodular enhancement, slightly increased since 2018, most consistent with hemangiomas. There is a 1.5 cm nodular intraluminal soft tissue density associated with the proximal transverse colon. Scattered lytic lesions of the bones are noted at T3 associated with superior endplate depression, T8, and T11 vertebral bodies. Right anterior third and anterolateral sixth rib probable pathologic fractures. There are additional L2-L4 vertebral body lytic lesions. Additionally, there is a right sacral mass which is extending into the sacroiliac joint and neural foramina, image 125 series 2 measuring 4.2 x 3.3 cm. Solid nodular component within the right sacral mass measures 2.6 cm.

## 2025-02-05 NOTE — HISTORY OF PRESENT ILLNESS
[Disease: _____________________] : Disease: [unfilled] [T: ___] : T[unfilled] [N: ___] : N[unfilled] [M: ___] : M[unfilled] [AJCC Stage: ____] : AJCC Stage: [unfilled] [de-identified] : Metastatic left breast cancer with bone metastases diagnosed at age 39. 12/26/21 CTA done when patient presented with right sided CP revealed no evidence of pulmonary embolism. Appearance of osseous metastases within the spine and ribs with associated pathologic fractures of the right third rib, right sixth rib and superior endplate of the T3 vertebral body. Subtle asymmetric skin thickening in the left breast and left axillary lymphadenopathy is suspicious for underlying left sided breast cancer. Left axillary lymphadenopathy is noted.  12/30/21 Mammogram and sonogram revealed a 7 x 5.6 x 6.5 asymmetry with nodularity, calcification and distortion in the left superior breast extending across the midline. Left axillary lymphadenopathy is noted. 01/08/22 CT scan C/A/P: Few scattered subcentimeter pulmonary nodules up to 3 mm in size are identified. Left axillary pathologic adenopathy with lymph node conglomerate measuring 2.9 x 1.9 cm, similar to prior. Several additional borderline enlarged left-sided retropectoral nodes are identified. There is a left internal mammary node measuring 5 mm. Small volume right axillary nodes. Tiny supraclavicular nodes. Anterior mediastinal soft tissue with interspersed fat is favored to represent involuting/residual thymus. There is a 3.0 x 2.0 cm left breast mass, concerning for primary neoplasm. There is associated left breast skin thickening. Inferior right hepatic lobe lesions again identified, with peripheral discontinuous nodular enhancement, slightly increased since 2018, most consistent with hemangiomas. There is a 1.5 cm nodular intraluminal soft tissue density associated with the proximal transverse colon. Scattered lytic lesions of the bones are noted at T3 associated with superior endplate depression, T8, and T11 vertebral bodies. Right anterior third and anterolateral sixth rib probable pathologic fractures. There are additional L2-L4 vertebral body lytic lesions. Additionally, there is a right sacral mass which is extending into the sacroiliac joint and neural foramina, image 125 series 2 measuring 4.2 x 3.3 cm. Solid nodular component within the right sacral mass measures 2.6 cm. 1/13/22 Sonogram guided biopsy of the left breast revealed DCIS and invasive ductal carcinoma, moderately differentiated, ER/NE 90%, HER-2 negative (IHC 1+ membrane staining). Biopsy of left axilla positive for similar IDC. Biopsy of right breast at 9:00 revealed fibrocystic changes and usual ductal hyperplasia. 1/18/22 Genetic testing with a 19 gene panel revealed no pathogenic variants and a VUS in PTEN c.[id number] ([id number]) 1/21/22 Lupron 7.5 mg started and continued monthly until BSO 1/21/22 Xgeva 120 mg started and will continue every 3 months 02/03/22 Bone scan showed increased uptake in T3, punctate uptake in T7 on the right, focal uptake in L3 and right L4, c/w lytic lesions on CT. Increased uptake in right 6th rib c/w fracture noted on CT. Photopenic are in right sacrum c/w right sacral mass on CT. Additional focal uptake in the left parietal bone. 2/04/22 Letrozole and Ibrance started. Ibrance held for one week during radiation 2/16/22 - 02/23/22 Palliative radiation to the sacrum, 2000 cGy in 5 fractions and T2-4 with 2000 cGy in 5 fractions with Dr. Samantha Batres for severe pain 4/7/22 BSO showed benign fallopian tubes and ovaries. 4/2023 Anastrozole started in place of letrozole due to arthralgias with significant improvement. 7/5/23 Guardant 360 did not show any actionable mutations. [de-identified] : ~4 cm Invasive ductal carcinoma, SBR 6/9, +LVI, ER/OH 90%, HER-2 negative [de-identified] : CA 27-29 54.6 prior to treatment and then normalized, CEA normal [de-identified] : Jacy started Lupron 1/21/22 and then 2 weeks later started on Letrozole and Ibrance on 02/04/22. S/P BSO on 4/7/22.   She is currently on her week off Ibrance. Xgeva given in 10/24 and next due in today. The Xgeva was going to be held this month due to dental issue and possible tooth extraction as per Dr. Santamaria patient's dentist. Discussed with Dr. Santamaria who mentioned that Jacy has Baby tooth (primary tooth) that never fell out as she never developed permanent tooth. She completed clindamycin for 10 days, which seems to have resolved the pain. According to the patient she was told that the tooth has not root and being held by the gums she does not need immediate extraction of the tooth, instead he is planning to put in a filling and continue to monitor as the patient doesn't want any interruptions in her therapy.  Patient knows that in the future the Xgeva would need to be held for 6 months prior to any extractions In 04/2023, she transitioned from letrozole to anastrozole with improvement in her generalized arthralgia.  She received a cortisone injection in her right knee in 6/23 and later had a course of viscous gel injections in 6/24 which helped. However, in the past few weeks she has been noticing a little more stiffness and arthralgia in her knees and both her hips. She reports being more active and trying to lose weight and believe that may also have contributed to the aches and pain in her LE. She no longer gets hot flashes but continues to notice some perspiration of the forehead when she is feels warm Her dermatologist prescribed Opzelura for her progressing Vitiligo, particularly on her face. She has  started the cream but has not been compliant because it may potentially lower her WBC, and she is concerned since her WBC is already low from taking Ibrance. She has chronic GERD which is under control with Protonix and Pepcid. She can't take NSAIDs (including Celebrex) on a regular basis due to her stomach. She is on Lexapro 20 mg which has helped but she continues to struggle emotionally with her diagnosis and treatment effects but is doing better with time. She is  and has two children who are 9 and 10.5 (3rd and 5th grade). Currently, her children are unaware of her diagnosis.  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 She is adjusting and is starting to like this position more.  PCP: Rosie Myers MD Endocrinologist: Nelia Genao MD GYN: Milady Lamb MD 2/18/25 Bone scan: 2/18/25 CT scan: 10/3/24 Bone scan: Persistent abnormalities in the skull, right sixth rib, and right sacroiliac region without significant interval change since the previous study of 5/15/2024. 10/03/24 CT scan C/A/P showed Stable exam compared to prior 5/15/2024. 5/15/24 CT scan C/A/P showed bony metastatic disease, not significantly changed from prior examination 2/20/2024. Left pelvic soft tissue density, probably enlarged lymph node, also not significantly changed. New healing fracture of the right anterior lateral sixth rib. 5/15/24 Bone scan showed interval increase in uptake in a right 6th rib lesion. Interval resolution of uptake in the right 4th and 5th ribs. 2/20/24 CT scan C/A/P showed enlarging left lower quadrant/pelvic soft tissue nodular density measuring 1.5 cm x 1.2 cm anterior to the left external iliac artery, series 2 image 140 may represent an enlarging lymph node versus mesenteric implant. Stable osseous metastasis. 02/20/24 Bone scan showed new punctate right rib lesions, which are most consistent with posttraumatic changes. Recommend interval short-term follow-up to assess for healing versus progression of disease. Bone metastases evident on prior exam are similar to or less apparent than on prior exam. 11/10/23 Bone scan showed no significant interval change compared to the previous bone scan of 7/24/2023. 11/10/23 CT scan showed no significant change. Stable presumed bone metastases. 7/24/23 Bone scan showed Interval improvement of lesions along the right femoral shaft and in the right hemisacrum. Otherwise grossly stable exam. 7/19/23 CT scan showed stable disease. 4/14/23 CT scan showed stable disease. 1/16/23 Bone scan showed stability with slight interval improvement in previously noted skeletal foci of disease. No new foci. 12/8/22 CT scan C/A/P showed stable interval examination with stable scattered bilateral small pulmonary nodules and no significant interval change in mixed lytic and sclerotic osseous metastases. Redemonstrated ill defined left breast mass with biopsy clip and associated left spiculated axillary lymph node with biopsy clip. 9/16/22 CT scan C/A/P showed the 3.7 cm right sacral mass is unchanged. Small sclerotic lesion in the left iliac wing is unchanged in size, more sclerotic on the current study. Mixed lytic and sclerotic osseous metastases are again noted, some of which demonstrate slightly increased peripheral sclerosis, likely reflecting posttreatment change. Previously noted tiny bilateral subcentimeter pulmonary nodules are largely unchanged. A previously referenced 1 to 2 mm right lower lobe pulmonary nodule is not definitively seen on the current exam however. 07/01/22 Bone scan showed tracer distribution of metastatic lesions is not significantly changed. Tracer activity is mostly less intense/unchanged except in the right sacrum which has more intense uptake. Small foci of increased uptake in the mid and distal right femur, more conspicuous than the previous study. Degenerative disease in major joints. 4/26/22 CT scan C/A/P Interval decrease in size of left breast mass and axillary adenopathy. Majority of tiny pulmonary nodules are no longer visualized, as described above. Several lytic bone metastases demonstrate increased sclerosis, suggesting healing. A 3.6 cm right sacral lytic mass is unchanged. Indeterminate nodular fold thickening within the proximal/mid transverse colon, less prominent, however correlation with colonoscopy is suggested. Other findings are unchanged, as described above. 02/03/22 Bone scan showed increased uptake in T3, punctate uptake in T7 on the right, focal uptake in L3 and right L4, c/w lytic lesions on CT. Increased uptake in right 6th rib c/w fracture noted on CT. Photopenic are in right sacrum c/w right sacral mass on CT. Additional focal uptake in the left parietal bone. 01/08/22 CT scan C/A/P: Few scattered subcentimeter pulmonary nodules up to 3 mm in size are identified. Left axillary pathologic adenopathy with lymph node conglomerate measuring 2.9 x 1.9 cm, similar to prior. Several additional borderline enlarged left-sided retropectoral nodes are identified. There is a left internal mammary node measuring 5 mm. Small volume right axillary nodes. Tiny supraclavicular nodes. Anterior mediastinal soft tissue with interspersed fat is favored to represent involuting/residual thymus. There is a 3.0 x 2.0 cm left breast mass, concerning for primary neoplasm. There is associated left breast skin thickening. Inferior right hepatic lobe lesions again identified, with peripheral discontinuous nodular enhancement, slightly increased since 2018, most consistent with hemangiomas. There is a 1.5 cm nodular intraluminal soft tissue density associated with the proximal transverse colon. Scattered lytic lesions of the bones are noted at T3 associated with superior endplate depression, T8, and T11 vertebral bodies. Right anterior third and anterolateral sixth rib probable pathologic fractures. There are additional L2-L4 vertebral body lytic lesions. Additionally, there is a right sacral mass which is extending into the sacroiliac joint and neural foramina, image 125 series 2 measuring 4.2 x 3.3 cm. Solid nodular component within the right sacral mass measures 2.6 cm.

## 2025-02-05 NOTE — PHYSICAL EXAM
[Fully active, able to carry on all pre-disease performance without restriction] : Status 0 - Fully active, able to carry on all pre-disease performance without restriction [Normal] : affect appropriate [de-identified] : Left breast mass ~3 x 3 cm. No palpable LN [de-identified] : hypopigmentation of on forehead and perioral region and nasal region.

## 2025-02-05 NOTE — HISTORY OF PRESENT ILLNESS
[Disease: _____________________] : Disease: [unfilled] [T: ___] : T[unfilled] [N: ___] : N[unfilled] [M: ___] : M[unfilled] [AJCC Stage: ____] : AJCC Stage: [unfilled] [de-identified] : Metastatic left breast cancer with bone metastases diagnosed at age 39. 12/26/21 CTA done when patient presented with right sided CP revealed no evidence of pulmonary embolism. Appearance of osseous metastases within the spine and ribs with associated pathologic fractures of the right third rib, right sixth rib and superior endplate of the T3 vertebral body. Subtle asymmetric skin thickening in the left breast and left axillary lymphadenopathy is suspicious for underlying left sided breast cancer. Left axillary lymphadenopathy is noted.  12/30/21 Mammogram and sonogram revealed a 7 x 5.6 x 6.5 asymmetry with nodularity, calcification and distortion in the left superior breast extending across the midline. Left axillary lymphadenopathy is noted. 01/08/22 CT scan C/A/P: Few scattered subcentimeter pulmonary nodules up to 3 mm in size are identified. Left axillary pathologic adenopathy with lymph node conglomerate measuring 2.9 x 1.9 cm, similar to prior. Several additional borderline enlarged left-sided retropectoral nodes are identified. There is a left internal mammary node measuring 5 mm. Small volume right axillary nodes. Tiny supraclavicular nodes. Anterior mediastinal soft tissue with interspersed fat is favored to represent involuting/residual thymus. There is a 3.0 x 2.0 cm left breast mass, concerning for primary neoplasm. There is associated left breast skin thickening. Inferior right hepatic lobe lesions again identified, with peripheral discontinuous nodular enhancement, slightly increased since 2018, most consistent with hemangiomas. There is a 1.5 cm nodular intraluminal soft tissue density associated with the proximal transverse colon. Scattered lytic lesions of the bones are noted at T3 associated with superior endplate depression, T8, and T11 vertebral bodies. Right anterior third and anterolateral sixth rib probable pathologic fractures. There are additional L2-L4 vertebral body lytic lesions. Additionally, there is a right sacral mass which is extending into the sacroiliac joint and neural foramina, image 125 series 2 measuring 4.2 x 3.3 cm. Solid nodular component within the right sacral mass measures 2.6 cm. 1/13/22 Sonogram guided biopsy of the left breast revealed DCIS and invasive ductal carcinoma, moderately differentiated, ER/OR 90%, HER-2 negative (IHC 1+ membrane staining). Biopsy of left axilla positive for similar IDC. Biopsy of right breast at 9:00 revealed fibrocystic changes and usual ductal hyperplasia. 1/18/22 Genetic testing with a 19 gene panel revealed no pathogenic variants and a VUS in PTEN c.828T>A ([id number]) 1/21/22 Lupron 7.5 mg started and continued monthly until BSO 1/21/22 Xgeva 120 mg started and will continue every 3 months 02/03/22 Bone scan showed increased uptake in T3, punctate uptake in T7 on the right, focal uptake in L3 and right L4, c/w lytic lesions on CT. Increased uptake in right 6th rib c/w fracture noted on CT. Photopenic are in right sacrum c/w right sacral mass on CT. Additional focal uptake in the left parietal bone. 2/04/22 Letrozole and Ibrance started. Ibrance held for one week during radiation 2/16/22 - 02/23/22 Palliative radiation to the sacrum, 2000 cGy in 5 fractions and T2-4 with 2000 cGy in 5 fractions with Dr. Samantha Batres for severe pain 4/7/22 BSO showed benign fallopian tubes and ovaries. 4/2023 Anastrozole started in place of letrozole due to arthralgias with significant improvement. 7/5/23 Guardant 360 did not show any actionable mutations. [de-identified] : ~4 cm Invasive ductal carcinoma, SBR 6/9, +LVI, ER/TX 90%, HER-2 negative [de-identified] : CA 27-29 54.6 prior to treatment and then normalized, CEA normal [de-identified] : Jacy started Lupron 1/21/22 and then 2 weeks later started on Letrozole and Ibrance on 02/04/22. S/P BSO on 4/7/22.   She is currently on her week off Ibrance. Xgeva given in 10/24 and next due in today. The Xgeva was going to be held this month due to dental issue and possible tooth extraction as per Dr. Santamaria patient's dentist. Discussed with Dr. Santamaria who mentioned that Jacy has Baby tooth (primary tooth) that never fell out as she never developed permanent tooth. She completed clindamycin for 10 days, which seems to have resolved the pain. According to the patient she was told that the tooth has not root and being held by the gums she does not need immediate extraction of the tooth, instead he is planning to put in a filling and continue to monitor as the patient doesn't want any interruptions in her therapy.  Patient knows that in the future the Xgeva would need to be held for 6 months prior to any extractions In 04/2023, she transitioned from letrozole to anastrozole with improvement in her generalized arthralgia.  She received a cortisone injection in her right knee in 6/23 and later had a course of viscous gel injections in 6/24 which helped. However, in the past few weeks she has been noticing a little more stiffness and arthralgia in her knees and both her hips. She reports being more active and trying to lose weight and believe that may also have contributed to the aches and pain in her LE. She no longer gets hot flashes but continues to notice some perspiration of the forehead when she is feels warm Her dermatologist prescribed Opzelura for her progressing Vitiligo, particularly on her face. She has  started the cream but has not been compliant because it may potentially lower her WBC, and she is concerned since her WBC is already low from taking Ibrance. She has chronic GERD which is under control with Protonix and Pepcid. She can't take NSAIDs (including Celebrex) on a regular basis due to her stomach. She is on Lexapro 20 mg which has helped but she continues to struggle emotionally with her diagnosis and treatment effects but is doing better with time. She is  and has two children who are 9 and 10.5 (3rd and 5th grade). Currently, her children are unaware of her diagnosis.  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 She is adjusting and is starting to like this position more.  PCP: Rosie Myers MD Endocrinologist: Nelia Genao MD GYN: Milady Lamb MD 2/18/25 Bone scan: 2/18/25 CT scan: 10/3/24 Bone scan: Persistent abnormalities in the skull, right sixth rib, and right sacroiliac region without significant interval change since the previous study of 5/15/2024. 10/03/24 CT scan C/A/P showed Stable exam compared to prior 5/15/2024. 5/15/24 CT scan C/A/P showed bony metastatic disease, not significantly changed from prior examination 2/20/2024. Left pelvic soft tissue density, probably enlarged lymph node, also not significantly changed. New healing fracture of the right anterior lateral sixth rib. 5/15/24 Bone scan showed interval increase in uptake in a right 6th rib lesion. Interval resolution of uptake in the right 4th and 5th ribs. 2/20/24 CT scan C/A/P showed enlarging left lower quadrant/pelvic soft tissue nodular density measuring 1.5 cm x 1.2 cm anterior to the left external iliac artery, series 2 image 140 may represent an enlarging lymph node versus mesenteric implant. Stable osseous metastasis. 02/20/24 Bone scan showed new punctate right rib lesions, which are most consistent with posttraumatic changes. Recommend interval short-term follow-up to assess for healing versus progression of disease. Bone metastases evident on prior exam are similar to or less apparent than on prior exam. 11/10/23 Bone scan showed no significant interval change compared to the previous bone scan of 7/24/2023. 11/10/23 CT scan showed no significant change. Stable presumed bone metastases. 7/24/23 Bone scan showed Interval improvement of lesions along the right femoral shaft and in the right hemisacrum. Otherwise grossly stable exam. 7/19/23 CT scan showed stable disease. 4/14/23 CT scan showed stable disease. 1/16/23 Bone scan showed stability with slight interval improvement in previously noted skeletal foci of disease. No new foci. 12/8/22 CT scan C/A/P showed stable interval examination with stable scattered bilateral small pulmonary nodules and no significant interval change in mixed lytic and sclerotic osseous metastases. Redemonstrated ill defined left breast mass with biopsy clip and associated left spiculated axillary lymph node with biopsy clip. 9/16/22 CT scan C/A/P showed the 3.7 cm right sacral mass is unchanged. Small sclerotic lesion in the left iliac wing is unchanged in size, more sclerotic on the current study. Mixed lytic and sclerotic osseous metastases are again noted, some of which demonstrate slightly increased peripheral sclerosis, likely reflecting posttreatment change. Previously noted tiny bilateral subcentimeter pulmonary nodules are largely unchanged. A previously referenced 1 to 2 mm right lower lobe pulmonary nodule is not definitively seen on the current exam however. 07/01/22 Bone scan showed tracer distribution of metastatic lesions is not significantly changed. Tracer activity is mostly less intense/unchanged except in the right sacrum which has more intense uptake. Small foci of increased uptake in the mid and distal right femur, more conspicuous than the previous study. Degenerative disease in major joints. 4/26/22 CT scan C/A/P Interval decrease in size of left breast mass and axillary adenopathy. Majority of tiny pulmonary nodules are no longer visualized, as described above. Several lytic bone metastases demonstrate increased sclerosis, suggesting healing. A 3.6 cm right sacral lytic mass is unchanged. Indeterminate nodular fold thickening within the proximal/mid transverse colon, less prominent, however correlation with colonoscopy is suggested. Other findings are unchanged, as described above. 02/03/22 Bone scan showed increased uptake in T3, punctate uptake in T7 on the right, focal uptake in L3 and right L4, c/w lytic lesions on CT. Increased uptake in right 6th rib c/w fracture noted on CT. Photopenic are in right sacrum c/w right sacral mass on CT. Additional focal uptake in the left parietal bone. 01/08/22 CT scan C/A/P: Few scattered subcentimeter pulmonary nodules up to 3 mm in size are identified. Left axillary pathologic adenopathy with lymph node conglomerate measuring 2.9 x 1.9 cm, similar to prior. Several additional borderline enlarged left-sided retropectoral nodes are identified. There is a left internal mammary node measuring 5 mm. Small volume right axillary nodes. Tiny supraclavicular nodes. Anterior mediastinal soft tissue with interspersed fat is favored to represent involuting/residual thymus. There is a 3.0 x 2.0 cm left breast mass, concerning for primary neoplasm. There is associated left breast skin thickening. Inferior right hepatic lobe lesions again identified, with peripheral discontinuous nodular enhancement, slightly increased since 2018, most consistent with hemangiomas. There is a 1.5 cm nodular intraluminal soft tissue density associated with the proximal transverse colon. Scattered lytic lesions of the bones are noted at T3 associated with superior endplate depression, T8, and T11 vertebral bodies. Right anterior third and anterolateral sixth rib probable pathologic fractures. There are additional L2-L4 vertebral body lytic lesions. Additionally, there is a right sacral mass which is extending into the sacroiliac joint and neural foramina, image 125 series 2 measuring 4.2 x 3.3 cm. Solid nodular component within the right sacral mass measures 2.6 cm.

## 2025-02-17 NOTE — PHYSICAL EXAM
[de-identified] : General: Patient appears well nourished without any distress  HEENT: no nodules palpated, no LAD   Skin: No significant rashes or bruises noted Neuro: No focal deficits noted. No tremors GI: No pain with palpation  [Alert] : alert [No Acute Distress] : no acute distress [Normal Voice/Communication] : normal voice communication [Normal Sclera/Conjunctiva] : normal sclera/conjunctiva [No Proptosis] : no proptosis [Normal Outer Ear/Nose] : the ears and nose were normal in appearance [Well Healed Scar] : well healed scar [No Respiratory Distress] : no respiratory distress [No Accessory Muscle Use] : no accessory muscle use [Normal Rate] : heart rate was normal [Regular Rhythm] : with a regular rhythm [No Edema] : no peripheral edema [No Spinal Tenderness] : no spinal tenderness [Kyphosis] : no kyphosis present [Normal Strength/Tone] : muscle strength and tone were normal [No Rash] : no rash [Acanthosis Nigricans] : no acanthosis nigricans [No Tremors] : no tremors [Oriented x3] : oriented to person, place, and time [Normal Affect] : the affect was normal [Normal Insight/Judgement] : insight and judgment were intact [de-identified] : no palpable thyroid gland

## 2025-02-17 NOTE — PHYSICAL EXAM
[de-identified] : General: Patient appears well nourished without any distress  HEENT: no nodules palpated, no LAD   Skin: No significant rashes or bruises noted Neuro: No focal deficits noted. No tremors GI: No pain with palpation  [Alert] : alert [No Acute Distress] : no acute distress [Normal Voice/Communication] : normal voice communication [Normal Sclera/Conjunctiva] : normal sclera/conjunctiva [No Proptosis] : no proptosis [Normal Outer Ear/Nose] : the ears and nose were normal in appearance [Well Healed Scar] : well healed scar [No Respiratory Distress] : no respiratory distress [No Accessory Muscle Use] : no accessory muscle use [Normal Rate] : heart rate was normal [Regular Rhythm] : with a regular rhythm [No Edema] : no peripheral edema [No Spinal Tenderness] : no spinal tenderness [Kyphosis] : no kyphosis present [Normal Strength/Tone] : muscle strength and tone were normal [No Rash] : no rash [Acanthosis Nigricans] : no acanthosis nigricans [No Tremors] : no tremors [Oriented x3] : oriented to person, place, and time [Normal Affect] : the affect was normal [Normal Insight/Judgement] : insight and judgment were intact [de-identified] : no palpable thyroid gland

## 2025-02-17 NOTE — ASSESSMENT
[Levothyroxine] : The patient was instructed to take Levothyroxine on an empty stomach, separate from vitamins, and wait at least 30 minutes before eating [FreeTextEntry1] : 42 year old female with PMH of MNG, lipoma, metastatic breast cancer, presents for elevated Tg s/p total thyroidectomy for MNG  # Post surgical hypothyroidism  # Hx of thyroidectomy for MNG  # Mildly Elevated Tg -Level very mildly elevated 5 >> repeat 2.81 -Explained to pt that this likely represents residual thyroid tissue post total thyroidectomy which is common -She may also have underlying hashimoto's which would also elevated TPO and Tg antibodies (given autoimmune history- also has vitiligo) >> TPO Ab negative  -Given pt had prior nodules ? benign vs AUS/FLUS and pt anxious given her cancer history, can repeat thyroid u/s not ensure no new nodule/masses  -Check TFTs today and adjust levothryoxine accordingly. Pt currently taking 137mcg daily   #Bone health -Premature ovarian insufficiency 2/2 cancer treatment and BSO -High risk of osteoporosis -Pt ongoing anastrazole treatment  -On xgeva 3 monthly with oncology  -Check BMD to establish baseline  -vit d 25 oh wnl in Aug 2025  -discussed dietary calcium intake & d3 supplementation   #Elevated PTH - elevated pth noted on labs from June 2024 (see scanned)  - Likely 2/2 to Xgeva - can be elevated up to 6 months post dose  -Calcium normal - no further investigation required currently  - serum calcium 9.9 in Feb 2025   f/u in 6 months or sooner if clinically indicated

## 2025-02-17 NOTE — HISTORY OF PRESENT ILLNESS
[FreeTextEntry1] : 42 year old female with PMH of MNG, lipoma, metastatic breast cancer, presents for elevated Tg s/p total thyroidectomy for MNG  #Metastatic breast cancer  -Diagnosed 2022  -Mets to spine and ribs; had RT to sacrum in Feb 2022 -Treatment  Lupron 7.5mg monthly until BSO 1/21/22 - 4/7/22 Xgeva 120mg every 3 months 1/21/22 -> next due 10/24  Letrozole + ibrance 2/4/22 BSO 4/7/22 Surgical menopause around age 39  Switched to anastrazole + ibrance 4/23 due to arthralgias  Used to have hot flashes - not well controlled - Retizen was sent but never filled she says. Now no longer having hot flashes. Has more sweating with mild exertion, mostly on her face. but does not feel that hot.   #MNG s/p total thyroidectomy - Tg elevated  -Total thyroidectomy 2009 -Pt reports multiple nodules - FNA not benign but not cancer ? AUS? but post thyroidectomy reports she was told that it was not cancer.  -Prior Endocrinologist: Dr Genao, Dr. Monique  -On levothyroxine 137mcg daily  -Latest FT4 1.4, Tg 5 (>2) -Denies any history of neck radiation -Denies any changes to voice, swallow -Denies any fhx of hypothyroidism, but does have autoimmune conditions -vitiligo in herself and family   #Bone rimma  -No prior fragility fractures in past; had hairline fracture in her rib where she had cancer  -No prior BMD >> says she is scheduled to do this.  -Taking vitamin 1000IU daily  -No consistent calcium intake  -Exercise - 3x per week 20-30min cardio  -No kidney dysfunction/no liver dysfunction  -Dose have hypothyroid dysfunction  -No family history  -No kidney stones  -No intolerances  -Ex smoker  -ETOH social  -No steroids   #Elevated PTH -Likely 2/2 Xgeva  -Normal calcium    >> will be changing

## 2025-03-04 NOTE — PHYSICAL EXAM
[Fully active, able to carry on all pre-disease performance without restriction] : Status 0 - Fully active, able to carry on all pre-disease performance without restriction [Normal] : affect appropriate [de-identified] : Left breast mass ~3 x 3 cm. No palpable LN [de-identified] : hypopigmentation of on forehead and perioral region and nasal region.

## 2025-03-04 NOTE — PHYSICAL EXAM
[Fully active, able to carry on all pre-disease performance without restriction] : Status 0 - Fully active, able to carry on all pre-disease performance without restriction [Normal] : affect appropriate [de-identified] : Left breast mass ~3 x 3 cm. No palpable LN [de-identified] : hypopigmentation of on forehead and perioral region and nasal region.

## 2025-03-04 NOTE — HISTORY OF PRESENT ILLNESS
[Disease: _____________________] : Disease: [unfilled] [T: ___] : T[unfilled] [N: ___] : N[unfilled] [M: ___] : M[unfilled] [AJCC Stage: ____] : AJCC Stage: [unfilled] [de-identified] : Metastatic left breast cancer with bone metastases diagnosed at age 39. 12/26/21 CTA done when patient presented with right sided CP revealed no evidence of pulmonary embolism. Appearance of osseous metastases within the spine and ribs with associated pathologic fractures of the right third rib, right sixth rib and superior endplate of the T3 vertebral body. Subtle asymmetric skin thickening in the left breast and left axillary lymphadenopathy is suspicious for underlying left sided breast cancer. Left axillary lymphadenopathy is noted.  12/30/21 Mammogram and sonogram revealed a 7 x 5.6 x 6.5 asymmetry with nodularity, calcification and distortion in the left superior breast extending across the midline. Left axillary lymphadenopathy is noted. 01/08/22 CT scan C/A/P: Few scattered subcentimeter pulmonary nodules up to 3 mm in size are identified. Left axillary pathologic adenopathy with lymph node conglomerate measuring 2.9 x 1.9 cm, similar to prior. Several additional borderline enlarged left-sided retropectoral nodes are identified. There is a left internal mammary node measuring 5 mm. Small volume right axillary nodes. Tiny supraclavicular nodes. Anterior mediastinal soft tissue with interspersed fat is favored to represent involuting/residual thymus. There is a 3.0 x 2.0 cm left breast mass, concerning for primary neoplasm. There is associated left breast skin thickening. Inferior right hepatic lobe lesions again identified, with peripheral discontinuous nodular enhancement, slightly increased since 2018, most consistent with hemangiomas. There is a 1.5 cm nodular intraluminal soft tissue density associated with the proximal transverse colon. Scattered lytic lesions of the bones are noted at T3 associated with superior endplate depression, T8, and T11 vertebral bodies. Right anterior third and anterolateral sixth rib probable pathologic fractures. There are additional L2-L4 vertebral body lytic lesions. Additionally, there is a right sacral mass which is extending into the sacroiliac joint and neural foramina, image 125 series 2 measuring 4.2 x 3.3 cm. Solid nodular component within the right sacral mass measures 2.6 cm. 1/13/22 Sonogram guided biopsy of the left breast revealed DCIS and invasive ductal carcinoma, moderately differentiated, ER/OR 90%, HER-2 negative (IHC 1+ membrane staining). Biopsy of left axilla positive for similar IDC. Biopsy of right breast at 9:00 revealed fibrocystic changes and usual ductal hyperplasia. 1/18/22 Genetic testing with a 19 gene panel revealed no pathogenic variants and a VUS in PTEN [id number] ([id number]) 1/21/22 Lupron 7.5 mg started and continued monthly until BSO 1/21/22 Xgeva 120 mg started and will continue every 3 months 02/03/22 Bone scan showed increased uptake in T3, punctate uptake in T7 on the right, focal uptake in L3 and right L4, c/w lytic lesions on CT. Increased uptake in right 6th rib c/w fracture noted on CT. Photopenic are in right sacrum c/w right sacral mass on CT. Additional focal uptake in the left parietal bone. 2/04/22 Letrozole and Ibrance started. Ibrance held for one week during radiation 2/16/22 - 02/23/22 Palliative radiation to the sacrum, 2000 cGy in 5 fractions and T2-4 with 2000 cGy in 5 fractions with Dr. Samantha Batres for severe pain 4/7/22 BSO showed benign fallopian tubes and ovaries. 4/2023 Anastrozole started in place of letrozole due to arthralgias with significant improvement. 7/5/23 Guardant 360 did not show any actionable mutations. [de-identified] : ~4 cm Invasive ductal carcinoma, SBR 6/9, +LVI, ER/DE 90%, HER-2 negative [de-identified] : CA 27-29 54.6 prior to treatment and then normalized, CEA normal [de-identified] : Jacy started Lupron 1/21/22 and then 2 weeks later started Letrozole and Ibrance on 02/04/22. S/P BSO on 4/7/22. In 04/23, she transitioned from letrozole to anastrozole with improvement in her generalized arthralgia. Xgeva last given 2/4/25. She has an active dental issue being managed by her dentist Dr. Santamaria with an infection associated with a baby tooth that never fell out as she never developed a permanent tooth. She completed clindamycin for 10 days and was doing better but then the pain recurred so she is back on the clindamycin. The patient has been told that the tooth has no root and is being held by the gums. However we would have to clarify whether the tooth is still based in the jaw as we need to avoid a risk of ONJ.  She received a cortisone injection in her right knee in 6/23 and later had a course of viscous gel injections in 6/24 which helped. Her dermatologist prescribed Opzelura for her progressing Vitiligo, particularly on her face. She has  started the cream but has not been compliant because it may potentially lower her WBC, and she is concerned since her WBC is already low from taking Ibrance. She has chronic GERD which is under control with Protonix and Pepcid. She can't take NSAIDs (including Celebrex) on a regular basis due to her stomach. She is on Lexapro 20 mg which has helped her mood but she continues to struggle emotionally with her diagnosis and treatment effects but is doing better with time. She is  and has two children who are 9 and 11 (3rd and 5th grade). Currently, her children are unaware of her diagnosis.  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 She is adjusting and is starting to like this position more.  PCP: Rosie Myers MD Endocrinologist: Nelia Genao MD GYN: Milady Lamb MD 2/18/25 Bone scan: Mixed changes with improvement of a Right anterolateral sixth rib focus but Right sixth costovertebral focus is slightly more conspicuous. Otherwise stable skull and femora foci. 2/18/25 CT scan C/A/P: Stable exam when compared to 10/3/2024. 10/3/24 Bone scan: Persistent abnormalities in the skull, right sixth rib, and right sacroiliac region without significant interval change since the previous study of 5/15/2024. 10/03/24 CT scan C/A/P showed Stable exam compared to prior 5/15/2024. 5/15/24 CT scan C/A/P showed bony metastatic disease, not significantly changed from prior examination 2/20/2024. Left pelvic soft tissue density, probably enlarged lymph node, also not significantly changed. New healing fracture of the right anterior lateral sixth rib. 5/15/24 Bone scan showed interval increase in uptake in a right 6th rib lesion. Interval resolution of uptake in the right 4th and 5th ribs. 2/20/24 CT scan C/A/P showed enlarging left lower quadrant/pelvic soft tissue nodular density measuring 1.5 cm x 1.2 cm anterior to the left external iliac artery, series 2 image 140 may represent an enlarging lymph node versus mesenteric implant. Stable osseous metastasis. 02/20/24 Bone scan showed new punctate right rib lesions, which are most consistent with posttraumatic changes. Recommend interval short-term follow-up to assess for healing versus progression of disease. Bone metastases evident on prior exam are similar to or less apparent than on prior exam. 11/10/23 Bone scan showed no significant interval change compared to the previous bone scan of 7/24/2023. 11/10/23 CT scan showed no significant change. Stable presumed bone metastases. 7/24/23 Bone scan showed Interval improvement of lesions along the right femoral shaft and in the right hemisacrum. Otherwise grossly stable exam. 7/19/23 CT scan showed stable disease. 4/14/23 CT scan showed stable disease. 1/16/23 Bone scan showed stability with slight interval improvement in previously noted skeletal foci of disease. No new foci. 12/8/22 CT scan C/A/P showed stable interval examination with stable scattered bilateral small pulmonary nodules and no significant interval change in mixed lytic and sclerotic osseous metastases. Redemonstrated ill defined left breast mass with biopsy clip and associated left spiculated axillary lymph node with biopsy clip. 9/16/22 CT scan C/A/P showed the 3.7 cm right sacral mass is unchanged. Small sclerotic lesion in the left iliac wing is unchanged in size, more sclerotic on the current study. Mixed lytic and sclerotic osseous metastases are again noted, some of which demonstrate slightly increased peripheral sclerosis, likely reflecting posttreatment change. Previously noted tiny bilateral subcentimeter pulmonary nodules are largely unchanged. A previously referenced 1 to 2 mm right lower lobe pulmonary nodule is not definitively seen on the current exam however. 07/01/22 Bone scan showed tracer distribution of metastatic lesions is not significantly changed. Tracer activity is mostly less intense/unchanged except in the right sacrum which has more intense uptake. Small foci of increased uptake in the mid and distal right femur, more conspicuous than the previous study. Degenerative disease in major joints. 4/26/22 CT scan C/A/P Interval decrease in size of left breast mass and axillary adenopathy. Majority of tiny pulmonary nodules are no longer visualized, as described above. Several lytic bone metastases demonstrate increased sclerosis, suggesting healing. A 3.6 cm right sacral lytic mass is unchanged. Indeterminate nodular fold thickening within the proximal/mid transverse colon, less prominent, however correlation with colonoscopy is suggested. Other findings are unchanged, as described above. 02/03/22 Bone scan showed increased uptake in T3, punctate uptake in T7 on the right, focal uptake in L3 and right L4, c/w lytic lesions on CT. Increased uptake in right 6th rib c/w fracture noted on CT. Photopenic are in right sacrum c/w right sacral mass on CT. Additional focal uptake in the left parietal bone. 01/08/22 CT scan C/A/P: Few scattered subcentimeter pulmonary nodules up to 3 mm in size are identified. Left axillary pathologic adenopathy with lymph node conglomerate measuring 2.9 x 1.9 cm, similar to prior. Several additional borderline enlarged left-sided retropectoral nodes are identified. There is a left internal mammary node measuring 5 mm. Small volume right axillary nodes. Tiny supraclavicular nodes. Anterior mediastinal soft tissue with interspersed fat is favored to represent involuting/residual thymus. There is a 3.0 x 2.0 cm left breast mass, concerning for primary neoplasm. There is associated left breast skin thickening. Inferior right hepatic lobe lesions again identified, with peripheral discontinuous nodular enhancement, slightly increased since 2018, most consistent with hemangiomas. There is a 1.5 cm nodular intraluminal soft tissue density associated with the proximal transverse colon. Scattered lytic lesions of the bones are noted at T3 associated with superior endplate depression, T8, and T11 vertebral bodies. Right anterior third and anterolateral sixth rib probable pathologic fractures. There are additional L2-L4 vertebral body lytic lesions. Additionally, there is a right sacral mass which is extending into the sacroiliac joint and neural foramina, image 125 series 2 measuring 4.2 x 3.3 cm. Solid nodular component within the right sacral mass measures 2.6 cm.

## 2025-03-04 NOTE — HISTORY OF PRESENT ILLNESS
[Disease: _____________________] : Disease: [unfilled] [T: ___] : T[unfilled] [N: ___] : N[unfilled] [M: ___] : M[unfilled] [AJCC Stage: ____] : AJCC Stage: [unfilled] [de-identified] : Metastatic left breast cancer with bone metastases diagnosed at age 39. 12/26/21 CTA done when patient presented with right sided CP revealed no evidence of pulmonary embolism. Appearance of osseous metastases within the spine and ribs with associated pathologic fractures of the right third rib, right sixth rib and superior endplate of the T3 vertebral body. Subtle asymmetric skin thickening in the left breast and left axillary lymphadenopathy is suspicious for underlying left sided breast cancer. Left axillary lymphadenopathy is noted.  12/30/21 Mammogram and sonogram revealed a 7 x 5.6 x 6.5 asymmetry with nodularity, calcification and distortion in the left superior breast extending across the midline. Left axillary lymphadenopathy is noted. 01/08/22 CT scan C/A/P: Few scattered subcentimeter pulmonary nodules up to 3 mm in size are identified. Left axillary pathologic adenopathy with lymph node conglomerate measuring 2.9 x 1.9 cm, similar to prior. Several additional borderline enlarged left-sided retropectoral nodes are identified. There is a left internal mammary node measuring 5 mm. Small volume right axillary nodes. Tiny supraclavicular nodes. Anterior mediastinal soft tissue with interspersed fat is favored to represent involuting/residual thymus. There is a 3.0 x 2.0 cm left breast mass, concerning for primary neoplasm. There is associated left breast skin thickening. Inferior right hepatic lobe lesions again identified, with peripheral discontinuous nodular enhancement, slightly increased since 2018, most consistent with hemangiomas. There is a 1.5 cm nodular intraluminal soft tissue density associated with the proximal transverse colon. Scattered lytic lesions of the bones are noted at T3 associated with superior endplate depression, T8, and T11 vertebral bodies. Right anterior third and anterolateral sixth rib probable pathologic fractures. There are additional L2-L4 vertebral body lytic lesions. Additionally, there is a right sacral mass which is extending into the sacroiliac joint and neural foramina, image 125 series 2 measuring 4.2 x 3.3 cm. Solid nodular component within the right sacral mass measures 2.6 cm. 1/13/22 Sonogram guided biopsy of the left breast revealed DCIS and invasive ductal carcinoma, moderately differentiated, ER/DE 90%, HER-2 negative (IHC 1+ membrane staining). Biopsy of left axilla positive for similar IDC. Biopsy of right breast at 9:00 revealed fibrocystic changes and usual ductal hyperplasia. 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 Increased uptake in right 6th rib c/w fracture noted on CT. Photopenic are in right sacrum c/w right sacral mass on CT. Additional focal uptake in the left parietal bone. 2/04/22 Letrozole and Ibrance started. Ibrance held for one week during radiation 2/16/22 - 02/23/22 Palliative radiation to the sacrum, 2000 cGy in 5 fractions and T2-4 with 2000 cGy in 5 fractions with Dr. Samantha Batres for severe pain 4/7/22 BSO showed benign fallopian tubes and ovaries. 4/2023 Anastrozole started in place of letrozole due to arthralgias with significant improvement. 7/5/23 Guardant 360 did not show any actionable mutations. [de-identified] : ~4 cm Invasive ductal carcinoma, SBR 6/9, +LVI, ER/ND 90%, HER-2 negative [de-identified] : CA 27-29 54.6 prior to treatment and then normalized, CEA normal [de-identified] : Jacy started Lupron 1/21/22 and then 2 weeks later started Letrozole and Ibrance on 02/04/22. S/P BSO on 4/7/22. In 04/23, she transitioned from letrozole to anastrozole with improvement in her generalized arthralgia. Xgeva last given 2/4/25. She has an active dental issue being managed by her dentist Dr. Santamaria with an infection associated with a baby tooth that never fell out as she never developed a permanent tooth. She completed clindamycin for 10 days and was doing better but then the pain recurred so she is back on the clindamycin. The patient has been told that the tooth has no root and is being held by the gums. However we would have to clarify whether the tooth is still based in the jaw as we need to avoid a risk of ONJ.  She received a cortisone injection in her right knee in 6/23 and later had a course of viscous gel injections in 6/24 which helped. Her dermatologist prescribed Opzelura for her progressing Vitiligo, particularly on her face. She has  started the cream but has not been compliant because it may potentially lower her WBC, and she is concerned since her WBC is already low from taking Ibrance. She has chronic GERD which is under control with Protonix and Pepcid. She can't take NSAIDs (including Celebrex) on a regular basis due to her stomach. She is on Lexapro 20 mg which has helped her mood but she continues to struggle emotionally with her diagnosis and treatment effects but is doing better with time. She is  and has two children who are 9 and 11 (3rd and 5th grade). Currently, her children are unaware of her diagnosis.  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 She is adjusting and is starting to like this position more.  PCP: Rosie Myers MD Endocrinologist: Nelia Genao MD GYN: Milady Lamb MD 2/18/25 Bone scan: Mixed changes with improvement of a Right anterolateral sixth rib focus but Right sixth costovertebral focus is slightly more conspicuous. Otherwise stable skull and femora foci. 2/18/25 CT scan C/A/P: Stable exam when compared to 10/3/2024. 10/3/24 Bone scan: Persistent abnormalities in the skull, right sixth rib, and right sacroiliac region without significant interval change since the previous study of 5/15/2024. 10/03/24 CT scan C/A/P showed Stable exam compared to prior 5/15/2024. 5/15/24 CT scan C/A/P showed bony metastatic disease, not significantly changed from prior examination 2/20/2024. Left pelvic soft tissue density, probably enlarged lymph node, also not significantly changed. New healing fracture of the right anterior lateral sixth rib. 5/15/24 Bone scan showed interval increase in uptake in a right 6th rib lesion. Interval resolution of uptake in the right 4th and 5th ribs. 2/20/24 CT scan C/A/P showed enlarging left lower quadrant/pelvic soft tissue nodular density measuring 1.5 cm x 1.2 cm anterior to the left external iliac artery, series 2 image 140 may represent an enlarging lymph node versus mesenteric implant. Stable osseous metastasis. 02/20/24 Bone scan showed new punctate right rib lesions, which are most consistent with posttraumatic changes. Recommend interval short-term follow-up to assess for healing versus progression of disease. Bone metastases evident on prior exam are similar to or less apparent than on prior exam. 11/10/23 Bone scan showed no significant interval change compared to the previous bone scan of 7/24/2023. 11/10/23 CT scan showed no significant change. Stable presumed bone metastases. 7/24/23 Bone scan showed Interval improvement of lesions along the right femoral shaft and in the right hemisacrum. Otherwise grossly stable exam. 7/19/23 CT scan showed stable disease. 4/14/23 CT scan showed stable disease. 1/16/23 Bone scan showed stability with slight interval improvement in previously noted skeletal foci of disease. No new foci. 12/8/22 CT scan C/A/P showed stable interval examination with stable scattered bilateral small pulmonary nodules and no significant interval change in mixed lytic and sclerotic osseous metastases. Redemonstrated ill defined left breast mass with biopsy clip and associated left spiculated axillary lymph node with biopsy clip. 9/16/22 CT scan C/A/P showed the 3.7 cm right sacral mass is unchanged. Small sclerotic lesion in the left iliac wing is unchanged in size, more sclerotic on the current study. Mixed lytic and sclerotic osseous metastases are again noted, some of which demonstrate slightly increased peripheral sclerosis, likely reflecting posttreatment change. Previously noted tiny bilateral subcentimeter pulmonary nodules are largely unchanged. A previously referenced 1 to 2 mm right lower lobe pulmonary nodule is not definitively seen on the current exam however. 07/01/22 Bone scan showed tracer distribution of metastatic lesions is not significantly changed. Tracer activity is mostly less intense/unchanged except in the right sacrum which has more intense uptake. Small foci of increased uptake in the mid and distal right femur, more conspicuous than the previous study. Degenerative disease in major joints. 4/26/22 CT scan C/A/P Interval decrease in size of left breast mass and axillary adenopathy. Majority of tiny pulmonary nodules are no longer visualized, as described above. Several lytic bone metastases demonstrate increased sclerosis, suggesting healing. A 3.6 cm right sacral lytic mass is unchanged. Indeterminate nodular fold thickening within the proximal/mid transverse colon, less prominent, however correlation with colonoscopy is suggested. Other findings are unchanged, as described above. 02/03/22 Bone scan showed increased uptake in T3, punctate uptake in T7 on the right, focal uptake in L3 and right L4, c/w lytic lesions on CT. Increased uptake in right 6th rib c/w fracture noted on CT. Photopenic are in right sacrum c/w right sacral mass on CT. Additional focal uptake in the left parietal bone. 01/08/22 CT scan C/A/P: Few scattered subcentimeter pulmonary nodules up to 3 mm in size are identified. Left axillary pathologic adenopathy with lymph node conglomerate measuring 2.9 x 1.9 cm, similar to prior. Several additional borderline enlarged left-sided retropectoral nodes are identified. There is a left internal mammary node measuring 5 mm. Small volume right axillary nodes. Tiny supraclavicular nodes. Anterior mediastinal soft tissue with interspersed fat is favored to represent involuting/residual thymus. There is a 3.0 x 2.0 cm left breast mass, concerning for primary neoplasm. There is associated left breast skin thickening. Inferior right hepatic lobe lesions again identified, with peripheral discontinuous nodular enhancement, slightly increased since 2018, most consistent with hemangiomas. There is a 1.5 cm nodular intraluminal soft tissue density associated with the proximal transverse colon. Scattered lytic lesions of the bones are noted at T3 associated with superior endplate depression, T8, and T11 vertebral bodies. Right anterior third and anterolateral sixth rib probable pathologic fractures. There are additional L2-L4 vertebral body lytic lesions. Additionally, there is a right sacral mass which is extending into the sacroiliac joint and neural foramina, image 125 series 2 measuring 4.2 x 3.3 cm. Solid nodular component within the right sacral mass measures 2.6 cm.

## 2025-04-28 NOTE — OB HISTORY
PATIENT:MEGAN MENDOZA                               MEDICAL RECORD: [id number]
: 42                                            LOCATION:D.CAT          
ACCT# [id number]                                       ADMISSION DATE: 19
 
 
 Generatedon:201913:39
Patient name: MEGAN MENDOZA Patient #: [id number] Visit #: [id number] SSN: : 
1942 Date of
study: 2019
Page: Of
Hemodynamic Procedure Report
****************************
Patient Data
Patient Demographics
Procedure consent was obtained
First Name: MEGAN           Gender: Male
Last Name: KELLY            : 1942
Middle Initial: ALF      Age: 77 year(s)
Patient #: [id number]       Race: 
Visit #: [id number]
Accession #:
[id number]
Additional ID: [id number]
Contact details
Address: [address] [place] [address]     Phone: [phone]
State: [place]
City: [place]
Zip code: [id number]
Past Medical History
Performed procedures and imaging results
Date     Procedure        Procedure Results      Comments
Stress testing   Positive->Intermediate
with SPECT MPI   risk
Allergies: No known allergies
Admission
Admission Data
Admission Date: 2019  Admission Time: 10:30
Arrival Date: 2019    Arrival Time: 0:00
Admit Source: Other         Insurance Payor: Medicare
[facility] #: [id number]
Height (in.): 69.69         BSA: 2.17 (m2)
Height (cm.): 177           BMI: 31.92 (kg/m2)
Weight (lbs.): 220.46
Weight (kg.): 100
Lab Results
Lab Result Date: 2019 Lab Result Time: 0:00
Biochemistry
Name         Units    Result                Min      Max
BUN          mg/dl    20       --(----)*-   7        18
Creatinine   mg/dl    1.2      --(---*)--   0.6      1.3
eGFR         ml/min   61.89475 *-(----)--   90       120
NONAFRICAN
CBC
Name         Units    Result                Min      Max
Hematocrit   %        51.6     --(---*)--   42       54
Hemoglobin   g/dl     17.9     --(----)*-   13.5     17.5
Procedure
Procedure Types
Cath Procedure
 
Diagnostic Procedure
LHC
LHC w/Coronaries
Sedation Charges
Moderate Sedation up to 15 minutes
Procedure Description
Procedure Date
Procedure Date: 2019
Procedure Start Time: 13:23
Procedure End Time: 13:37
Procedure Staff
Name                            Function
Den Maguire MD                   Performing Physician
Lorraine Hercules RT               Monitor
Shahrzad Quijano RT              Monitor
Sergei Bo RT                     Scrub
Slim Morris RN                  Nurse
Procedure Data
Cath Procedure
Fluoroscopy
Diagnostic fluoroscopy      Total fluoroscopy Time: 2.5
time: 2.5 min               min
Diagnostic fluoroscopy      Total fluoroscopy dose: 662
dose: 662 mGy               mGy
Contrast Material
Contrast Material Type                       Amount (ml)
Isovue 300                                   49
Entry Location
Entry     Primary  Successful  Side  Size  Upsize Upsize Entry    Closure     [place]
ccessful  Closure
Location                             (Fr)  1 (Fr) 2 (Fr) Remarks  Device        
          Remarks
Radial                         Right 5 Fr                         Mechanical
artery                                                            Compression
Estimated blood loss: 5 ml
Diagnostic catheters
Device Type               Used For           End Catheter
Placement
DIAGNOSTIC Robel 110cm    Procedure
5Fr catheter ([id number])
Procedure Complications
No complications
Procedure Medications
Medication           Administration Route Dosage
Oxygen               etCO2 Nasal cannula  2 l/min
Lidocaine 2%         added to field       20
Heparin Flush Bag    added to field       2 bags
(1000units/500ml NS)
0.9% NaCl            I.V.                 100 ml/hr
Radial Cocktail      I.A.                 1 syringe
(Verapamil 2mg/Nitro
400mcg/Heparin
1500units)
Versed               I.V.                 1 mg
Fentanyl             I.V.                 50 mcg
Versed               I.V.                 1 mg
Fentanyl             I.V.                 50 mcg
Versed               I.V.                 1 mg
Hemodynamics
Rest
 
BSA: 2.17 (m2) HGB: 17.9 (g/dl) O2 Consumption: Estimated: 250.55 (ml/min) O2 Co
nsumption indexed:
Estimated:115.46 (ml/min/m) Heart Rate: 72 (bpm)
Pressure Samples
Time     Site     Value (mmHg) Purpose      Heart      Use
Rate(bpm)
13:26    LV       134/6,22     Snapshot     80
Gradients
Valve  Time  Site Site Mean    SEP/DFP    Peak To Heart  Use
1    2    (mmHg)  (sec/min)  Peak    Rate
(mmHg)  (bpm)
Aortic 13:27 LV   AO                              51
Snapshots
Pre Cath      Intra         NCS           Post Cath
Vital Signs
Time     Heart  Resp   SPO2 etCO2   NIBP (mmHg)  Rhythm  Pain    Sedation
Rate   (ipm)  (%)  (mmHg)                       Status  Level
(bpm)
13:07:36 72     19     97   37.2    184/115(165) NSR     0 (11)  10(A)
, No
pain
13:12:07 69     14     92   36.5    167/91(131)  NSR     0 (11)  10(A)
, No
pain
13:16:29 69     18     93   38.7    147/88(124)  NSR     0 (11)  10(A)
, No
pain
13:20:51 69     14     92   41.7    144/78(115)  NSR     0 (11)  10(A)
, No
pain
13:25:13 66     15     94   33.5    138/77(106)  NSR     0 (11)  9(A)
, No
pain
13:29:25 70     23     94   32.8    114/74(90)   NSR     0 (11)  9(A)
, No
pain
13:33:41 74     19     93   23.1    103/70(86)   NSR     0 (11)  10(A)
, No
pain
13:37:51               92   35      114/73(100)  NSR     0 (11)  9(A)
, No
pain
Medications
Time     Medication       Route   Dose    Verified Delivered Reason       Notes 
 Effectiveness
by       by
13:09:03 Oxygen           etCO2   2 l/min Den     Buffie    used for
Nasal           Andrez Morris RN   procedure
cannula
13:09:48 Lidocaine 2%     added   20ml    Den     Den      for local
to      vial    Andrez Maguire MD  anesthetic
field
13:09:54 Heparin Flush    added   2 bags  Den     Den      used for
Bag              to              Andrez Maguire MD  procedure
(1000units/500ml field
NS)
13:10:08 0.9% NaCl        I.V.    100     Den     Buffie    used for
ml/hr   Andrez Morris RN   procedure
13:17:46 Versed           I.V.    1 mg    Den     Buffie    for sedation
Andrez Morris RN
 
13:18:03 Fentanyl         I.V.    50 mcg  Den     Buffie    for sedation
Andrez Morris RN
13:20:13 Versed           I.V.    1 mg    Den     Buffie    for sedation
Andrez Morris RN
13:20:17 Fentanyl         I.V.    50 mcg  Den     Buffie    for sedation
Andrez Morris RN
13:25:30 Radial Cocktail  I.A.    1       Den     Den      for
(Verapamil               syringe Andrez Maguire MD  vasodilation
2mg/Nitro
400mcg/Heparin
1500units)
13:29:11 Versed           I.V.    1 mg    Den     Den      for sedation
Andrez Maguire MD
Procedure Log
Time     Note
12:46:29 Informed consent obtained and on chart
12:47:06 Procedure Status Elective Heart Cath (OP).
12:47:07 Time tracking: Regular hours (M-F 7:00 - 5:00)
12:47:10 Plan of Care:Hemodynamics will remain stable., Cardiac rhythm will
remain stable., Comfort level will be maintained., Respiratory function
will remain adequate., Patient/ family verbilizes understanding of
procedure., Procedure tolerated without complication., Recovers from
procedure without complications..
12:47:12 H&P Date Dictated: 2019 Within 30 days and on chart., H&P Addendu
m
completed by physician on day of procedure. (MUST COMPLETE FOR ALL
OUTPATIENTS).
12:48:34 Patient Weight : 220.46 lbs
12:48:38 Patient Height : 69.69 inches
12:48:41 Arrival Date: 2019 12:00:00 AM
12:50:36 Sergei CARO(R) sent for patient. Start room use.
12:51:28 Stress Test: yes; abnormal INFERIOR AND APICAL.
12:54:24 Risk of Mortality: 1.2
12:54:26 Risk of blood transfusion: .1
12:54:30 Risk of JERICA: 3.7
12:56:26 Patient received from Pre/Post Procedure Room to CCL 1 Alert and
oriented. Tansferred to table in Supine position.
12:56:28 Warm blankets applied, and joaan hugger turned on for patient comfort.
12:56:29 Correct patient and procedure confirmed by team.
12:56:31 ECG and BP/O2 sat monitors applied to patient.
13:01:07 Pre-procedure instructions explained to patient.
13:01:08 Pre-op teaching completed and patient verbalized understanding.
13:01:12 Family in patients room.
13:01:15 Patient NPO since Midnight.
13:01:32 Patient allergic to No known allergies
13:03:29 **ACC** Patient presents with No angina; no symptoms CCS Anginal Class
0--No symptoms, no angina.
13:04:34 **ACC**Patient has been prescribed/administered the following
anti-anginal medication within the last 2 weeks: ARB
13:05:00 Vital chart was started
13:05:02 Baseline sample Acquired.
13:05:51 Rhythm: sinus rhythm
13:05:58 Full Disclosure recording started
13:05:59 -----------------------------------------------------------------------
-
13:06:05 Is the patient allergic to Iodine/contrast media? No.
13:06:07 Was the patient premedicated? Yes
13:06:10 Is patient on blood thinner?No
13:06:33 Patient diabetic? No.
13:06:35 ----Pre-sedation anethsthesia assessment.----
 
13:06:39 Previous problem with sedation/anesthesia? No ?
13:06:47 Snore? Yes
13:06:50 Sleep apnea? No
13:06:53 Deviated septum? No
13:06:56 Opens mouth fully? Yes
13:06:58 Sticks out tongue? Yes
13:07:02 Airway obstruction? No ?
13:07:11 Dentures? Yes IN TIGHT
13:07:30 Pre procedure: right dorsailis pedis pulse 2+ Normal; easily
identifiable; not easily obliterated
13:07:37 Modified Kevin's test Ulnar < 7 seconds
13:07:46 Patient pain scale 0/10 ?.
13:08:07 IV patent on arrival in right antecubital with 0.9% NaCl at O.
13:09:01 Lab Result : Hemoglobin 17.9 g/dl
13:09:01 Lab Result : eGFR NONAFRICAN 61.75353 ml/min
13:09:01 Lab Result : BUN 20 mg/dl
13:09:01 Lab Result : Creatinine 1.2 mg/dl
13:09:01 Lab Result : Hematocrit 51.6 %
13:09:03 Oxygen 2 l/min etCO2 Nasal cannula was administered by Slim Morris RN;
used for procedure; Verbal order read back and verified.
13:09:11 Lab results completed and on chart.
13:09:17 Right Radial & Right Groin area was prepped with chlora-prep and draped
in sterile fashion
13:09:18 Alarms reviewed by GAVIN SAUL
13:09:19 Sharps counted by scrub and verified by R.N.
13:09:48 Lidocaine 2% 20ml vial added to field was administered by Den Maguire MD
;
for local anesthetic; Verbal order read back and verified.
13:09:54 Heparin Flush Bag (1000units/500ml NS) 2 bags added to field was
administered by Den Maguire MD; used for procedure; Verbal order read
back and verified.
13:10:08 0.9% NaCl 100 ml/hr I.V. was administered by Slim Morris RN; used for
procedure; Verbal order read back and verified.
13:11:02 Use device set Radial Dx or PCI
13:11:06 ACIST Syringe (03234) opened to sterile field.
13:11:07 Medline Cath Pack ([id number]) opened to sterile field.
13:11:08 Bag Decanter (2002S) opened to sterile field.
13:11:09 ACIST Hand Control (27328) opened to sterile field.
13:11:10 ACIST Manifold (65013) opened to sterile field.
13:11:11 Tegaderm 4 x 4 (1626W) opened to sterile field.
13:11:12 MBrace Wrist Support ([id number]) opened to sterile field.
13:11:15 EMERALD Guide Wire (295-114) opened to sterile field.
13:11:16 SHEATH 6FR RAIN ([id number]) opened to sterile field.
13:11:18 NEEDLE Cook 21G 4cm Radial (M03809) opened to sterile field.
13:14:16 Zero performed for pressure channel P1
13:14:28 Physician arrived
13:14:29 --------ALL STOP TIME OUT------
13:14:29 Final Timeout: patient, procedure, and site verified with staff and
physician. All members of the team are in agreement.
13:14:33 Right Radial & Right Groin site verified by team.
13:14:41 Fire Safety Assessment: A--An alcohol-based skin anteseptic being used
preoperatively., C--Open oxygen or nitrous oxide is being used., D--An
ESU, laser, or fiber-optic light is being used.
13:14:47 Physical assessment completed. ASA score P 2 - A patient with mild
systemic disease as per Den Maguire MD.
13:14:54 2) 60-89 Mildly reduced kidney function, and other findings (as for
stage 1) point to kidney disease.
13:14:59 Maximum allowable contrast dose (3.7 X eGFR X 0.75)172 ml.
13:15:06 Sedation plan: IV Moderate Sedation Medication:Versed, Fentanyl
13:16:26 Zero performed for pressure channel P1
 
13:17:46 Versed 1 mg I.V. was administered by Slim Morris RN; for sedation;
Verbal order read back and verified.
13:18:03 Fentanyl 50 mcg I.V. was administered by Slim Morris RN; for sedation;
Verbal order read back and verified.
13:18:18 Insurance Payor : Medicare
13:18:21 Admit Source: Other
13:20:13 Versed 1 mg I.V. was administered by Slim Morris RN; for sedation;
Verbal order read back and verified.
13:20:17 Fentanyl 50 mcg I.V. was administered by Slim Morris RN; for sedation;
Verbal order read back and verified.
13:21:43 Procedure started.
13:23:20 Local anesthetic to right radial artery with Lidocaine 2% by Den Maguire MD.**INITIAL ACCESS ONLY**
13:24:49 A 5 Fr sheath was inserted into the Right Radial artery
13:25:19 A DIAGNOSTIC Robel 110cm 5Fr catheter ([id number]) was advanced over the
wire and used for Procedure.
13:25:30 Radial Cocktail (Verapamil 2mg/Nitro 400mcg/Heparin 1500units) 1 syring
e
I.A. was administered by Den Maguire MD; for vasodilation; Verbal order
read back and verified.
13:26:24 LV gram done using MILLAN
13:26:55 Injector settings: Ml/sec: 5, Volume: 15,
13:27:06 EF : 50 %
13:27:36 LV hemodynamics recorded.
13:27:51 RCA angiography performed.
13:28:09 Injector settings: Ml/sec: 3, Volume: 6,
13:28:38 LCA angiography performed.
13:29:11 Versed 1 mg I.V. was administered by Den Maguire MD; for sedation; Verba
l
order read back and verified.
13:29:11 Injector settings: Ml/sec: 3, Volume: 6,
13:29:44 **ACC**Dominant side:Right
13:33:03 Catheter removed.
13:33:14 ZEPHYR REGULAR TR BAND (033114) opened to sterile field.
13:33:26 Procedure ended.(Physican Out)
13:33:34 Sheath removed intact; hemostasis achieved with Mechanical Compression
to the Right Radial artery.
13:33:57 Contrast amount:Isovue 300 49ml.
13:34:02 Maximum allowable dose exceeded? No.
13:34:10 Fluoroscopy time 02.50 minutes.
13:34:40 Flurop Dose total: 662
13:34:40 Fluoroscopy dose: 662 mGy
13:34:48 Dose Area Product 54697 mGy/cm.
13:34:52 Sharps counted by scrub and verified by R.N.
13:34:59 [place] band inflated with 10cc of air.
13:35:01 Insertion/operative site no bleeding no hematoma.
13:35:11 Post right radial artery:stable
13:35:14 Post Procedure Pulses reassessed and unchanged
13:35:32 Post-procedure physical assessment completed. ASA score P 2 - A patient
with mild systemic disease as per Den Maguire MD.
13:35:38 Post procedure rhythm: unchanged.
13:35:44 Estimated blood loss: 5 ml
13:35:47 Post procedure instruction explained to patient.Patient verbalizes
understanding.
13:35:48 Patient needs reinforcement of post procedure teaching.
13:36:01 Procedure type changed to Cath procedure, Diagnostic procedure, LHC, 
C
w/Coronaries, Sedation Charges, Moderate Sedation up to 15 minutes
13:36:04 Procedure and supply charges have been captured, reviewed, submitted an
d
 
are correct.
13:36:59 Procedure Complication : No complications
13:37:03 Vital chart was stopped
13:37:14 [facility] Findings: mild to moderate CAD (<70%)
13:37:19 Operative report dictated upon procedure completion.
13:37:20 See physician's report for complete and final results.
13:37:23 Report given to Pre/Post Procedure Room.
13:37:27 Patient transfered to Pre/Post Procedure Room with Stretcher.
13:37:30 Procedure ended.
13:37:30 Full Disclosure recording stopped
13:37:33 End room use (Document Last)
Device Usage
Item Name   Manufacture  Quantity  Catalog      Hospital Part     Current Minima
l Lot# /
Number       Charge   Number   Stock   Stock   Serial#
Code
ACIST       Acist        1         06533        919833   081654   095604  20
Syringe     Medical
(47696)     Systems Inc
Medline     Medline      1         [id number]    677061   17470    772995  5
Cath Pack
([id number])
Bag         Microtek     1         S        399421   12466    520702  5
Decanter    Medical Inc.
(S)
ACIST Hand  Acist        1         16276        842302   771985   242071  5
Control     Medical
(35514)     Systems Inc
ACIST       Acist        1         84687        210058   582305   113934  5
Manifold    Medical
(72555)     Systems Inc
Tegaderm 4  3M           1         1626W        308287   777797   185364  5
x 4 (1626W)
MBrace      Advanced     1         [id number]  036476   64405    077727  5
Wrist       Vascular
Support     Dynamics
(308035334)
EMERALD     Cardinal     1         502-613      211947   218526   343548  5
Guide Wire  Health
(427-455)
SHEATH 6FR  Cardinal     1         [id number]      306784   1412008  425120  5
[facility]        Health
(4407276)
NEEDLE Cook Cook Medical 1         [id number]       399155   856594   286453  5
21G 4cm
Radial
(S15117)
DIAGNOSTIC  Terumo       1               015833   733864   173230  5
Robel 110cm
5Fr
catheter
(391574)
ZEPHYR      Cardinal     1         867061       388833   2128196  429383  5
REGULAR TR  Health
BAND
(313097)
Signature Audit [place]
Stage           Time        Signature      Unsigned
Intra-Procedure 2019  Shahrzad
1:38:12 PM  Macie
 
RT(R) (CV)
Intra-Procedure 2019  Slim Morris RN
1:38:46 PM
Intra-Procedure 2019  Den Maguire MD
1:39:23 PM
 
 
 
 
 
 
 
 
 
 
 
 
 
 
 
 
 
 
 
 
 
 
 
 
 
 
 
 
 
 
 
 
 
 
 
 
 
 
 
 
 
 
 
 
 
 
 
 
 
 
[facility]                                          
[address] [phone] [Regular Cycle Intervals] : periods have been regular [Menarche Age: ____] : age at menarche was [unfilled] [___] : Full Term: [unfilled]

## 2025-04-29 NOTE — PHYSICAL EXAM
[Fully active, able to carry on all pre-disease performance without restriction] : Status 0 - Fully active, able to carry on all pre-disease performance without restriction [Normal] : affect appropriate [de-identified] : Left breast mass ~3 x 3 cm. No palpable LN [de-identified] : hypopigmentation of on forehead and perioral region and nasal region.

## 2025-04-29 NOTE — HISTORY OF PRESENT ILLNESS
[Disease: _____________________] : Disease: [unfilled] [T: ___] : T[unfilled] [N: ___] : N[unfilled] [M: ___] : M[unfilled] [AJCC Stage: ____] : AJCC Stage: [unfilled] [de-identified] : Metastatic left breast cancer with bone metastases diagnosed at age 39. 12/26/21 CTA done when patient presented with right sided CP revealed no evidence of pulmonary embolism. Appearance of osseous metastases within the spine and ribs with associated pathologic fractures of the right third rib, right sixth rib and superior endplate of the T3 vertebral body. Subtle asymmetric skin thickening in the left breast and left axillary lymphadenopathy is suspicious for underlying left sided breast cancer. Left axillary lymphadenopathy is noted.  12/30/21 Mammogram and sonogram revealed a 7 x 5.6 x 6.5 asymmetry with nodularity, calcification and distortion in the left superior breast extending across the midline. Left axillary lymphadenopathy is noted. 01/08/22 CT scan C/A/P: Few scattered subcentimeter pulmonary nodules up to 3 mm in size are identified. Left axillary pathologic adenopathy with lymph node conglomerate measuring 2.9 x 1.9 cm, similar to prior. Several additional borderline enlarged left-sided retropectoral nodes are identified. There is a left internal mammary node measuring 5 mm. Small volume right axillary nodes. Tiny supraclavicular nodes. Anterior mediastinal soft tissue with interspersed fat is favored to represent involuting/residual thymus. There is a 3.0 x 2.0 cm left breast mass, concerning for primary neoplasm. There is associated left breast skin thickening. Inferior right hepatic lobe lesions again identified, with peripheral discontinuous nodular enhancement, slightly increased since 2018, most consistent with hemangiomas. There is a 1.5 cm nodular intraluminal soft tissue density associated with the proximal transverse colon. Scattered lytic lesions of the bones are noted at T3 associated with superior endplate depression, T8, and T11 vertebral bodies. Right anterior third and anterolateral sixth rib probable pathologic fractures. There are additional L2-L4 vertebral body lytic lesions. Additionally, there is a right sacral mass which is extending into the sacroiliac joint and neural foramina, image 125 series 2 measuring 4.2 x 3.3 cm. Solid nodular component within the right sacral mass measures 2.6 cm. 1/13/22 Sonogram guided biopsy of the left breast revealed DCIS and invasive ductal carcinoma, moderately differentiated, ER/LA 90%, HER-2 negative (IHC 1+ membrane staining). Biopsy of left axilla positive for similar IDC. Biopsy of right breast at 9:00 revealed fibrocystic changes and usual ductal hyperplasia. 1/18/22 Genetic testing with a 19 gene panel revealed no pathogenic variants and a VUS in PTEN c.[id number] ([id number]) 1/21/22 Lupron 7.5 mg started and continued monthly until BSO 1/21/22 Xgeva 120 mg started and will continue every 3 months 02/03/22 Bone scan showed increased uptake in T3, punctate uptake in T7 on the right, focal uptake in L3 and right L4, c/w lytic lesions on CT. Increased uptake in right 6th rib c/w fracture noted on CT. Photopenic are in right sacrum c/w right sacral mass on CT. Additional focal uptake in the left parietal bone. 2/04/22 Letrozole and Ibrance started. Ibrance held for one week during radiation 2/16/22 - 02/23/22 Palliative radiation to the sacrum, 2000 cGy in 5 fractions and T2-4 with 2000 cGy in 5 fractions with Dr. Samantha Batres for severe pain 4/7/22 BSO showed benign fallopian tubes and ovaries. 4/2023 Anastrozole started in place of letrozole due to arthralgias with significant improvement. 7/5/23 Guardant 360 did not show any actionable mutations. [de-identified] : ~4 cm Invasive ductal carcinoma, SBR 6/9, +LVI, ER/VT 90%, HER-2 negative [de-identified] : CA 27-29 54.6 prior to treatment and then normalized, CEA normal [de-identified] : Jacy started Lupron 1/21/22 and then 2 weeks later started Letrozole and Ibrance on 02/04/22. S/P BSO on 4/7/22. In 04/23, she transitioned from letrozole to anastrozole with improvement in her generalized arthralgia. Xgeva last given 2/4/25. She is in her first week of her current cycle of Ibrance. She had an episode of colitis at the end of 3/2025 diagnosed on CT scan and treated with antibiotics, most likely triggered by food poisoning. She has an active dental issue being managed by her dentist Dr. Santamaria with an infection associated with a baby tooth that never fell out as she never developed a permanent tooth. She completed clindamycin for 10 days and improved and then had a recurrence. Last Xgeva in 2/25 so tooth will be extracted in 8/25. She received a cortisone injection in her right knee in 6/23 and later had a course of viscous gel injections in 6/24 which helped. Her dermatologist prescribed Opzelura for her progressing Vitiligo, particularly on her face. She has  started the cream but has not been compliant because it may potentially lower her WBC, and she is concerned since her WBC is already low from taking Ibrance. She has chronic GERD which is under control with Protonix and Pepcid. She can't take NSAIDs (including Celebrex) on a regular basis due to her stomach. She is on Lexapro 20 mg which has helped her mood but she continues to struggle emotionally with her diagnosis and treatment effects but is doing better with time. She is  and has two children who are 9 and 11 (3rd and 5th grade). Currently, her children are unaware of her diagnosis.  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 She is adjusting and is starting to like this position more.  PCP: Rosie Myers MD Endocrinologist: Nelia Genao MD GYN: Milady Lamb MD 2/18/25 Bone scan: Mixed changes with improvement of a Right anterolateral sixth rib focus but Right sixth costovertebral focus is slightly more conspicuous. Otherwise stable skull and femora foci. 2/18/25 CT scan C/A/P: Stable exam when compared to 10/3/2024. 10/3/24 Bone scan: Persistent abnormalities in the skull, right sixth rib, and right sacroiliac region without significant interval change since the previous study of 5/15/2024. 10/03/24 CT scan C/A/P showed Stable exam compared to prior 5/15/2024. 5/15/24 CT scan C/A/P showed bony metastatic disease, not significantly changed from prior examination 2/20/2024. Left pelvic soft tissue density, probably enlarged lymph node, also not significantly changed. New healing fracture of the right anterior lateral sixth rib. 5/15/24 Bone scan showed interval increase in uptake in a right 6th rib lesion. Interval resolution of uptake in the right 4th and 5th ribs. 2/20/24 CT scan C/A/P showed enlarging left lower quadrant/pelvic soft tissue nodular density measuring 1.5 cm x 1.2 cm anterior to the left external iliac artery, series 2 image 140 may represent an enlarging lymph node versus mesenteric implant. Stable osseous metastasis. 02/20/24 Bone scan showed new punctate right rib lesions, which are most consistent with posttraumatic changes. Recommend interval short-term follow-up to assess for healing versus progression of disease. Bone metastases evident on prior exam are similar to or less apparent than on prior exam. 11/10/23 Bone scan showed no significant interval change compared to the previous bone scan of 7/24/2023. 11/10/23 CT scan showed no significant change. Stable presumed bone metastases. 7/24/23 Bone scan showed Interval improvement of lesions along the right femoral shaft and in the right hemisacrum. Otherwise grossly stable exam. 7/19/23 CT scan showed stable disease. 4/14/23 CT scan showed stable disease. 1/16/23 Bone scan showed stability with slight interval improvement in previously noted skeletal foci of disease. No new foci. 12/8/22 CT scan C/A/P showed stable interval examination with stable scattered bilateral small pulmonary nodules and no significant interval change in mixed lytic and sclerotic osseous metastases. Redemonstrated ill defined left breast mass with biopsy clip and associated left spiculated axillary lymph node with biopsy clip. 9/16/22 CT scan C/A/P showed the 3.7 cm right sacral mass is unchanged. Small sclerotic lesion in the left iliac wing is unchanged in size, more sclerotic on the current study. Mixed lytic and sclerotic osseous metastases are again noted, some of which demonstrate slightly increased peripheral sclerosis, likely reflecting posttreatment change. Previously noted tiny bilateral subcentimeter pulmonary nodules are largely unchanged. A previously referenced 1 to 2 mm right lower lobe pulmonary nodule is not definitively seen on the current exam however. 07/01/22 Bone scan showed tracer distribution of metastatic lesions is not significantly changed. Tracer activity is mostly less intense/unchanged except in the right sacrum which has more intense uptake. Small foci of increased uptake in the mid and distal right femur, more conspicuous than the previous study. Degenerative disease in major joints. 4/26/22 CT scan C/A/P Interval decrease in size of left breast mass and axillary adenopathy. Majority of tiny pulmonary nodules are no longer visualized, as described above. Several lytic bone metastases demonstrate increased sclerosis, suggesting healing. A 3.6 cm right sacral lytic mass is unchanged. Indeterminate nodular fold thickening within the proximal/mid transverse colon, less prominent, however correlation with colonoscopy is suggested. Other findings are unchanged, as described above. 02/03/22 Bone scan showed increased uptake in T3, punctate uptake in T7 on the right, focal uptake in L3 and right L4, c/w lytic lesions on CT. Increased uptake in right 6th rib c/w fracture noted on CT. Photopenic are in right sacrum c/w right sacral mass on CT. Additional focal uptake in the left parietal bone. 01/08/22 CT scan C/A/P: Few scattered subcentimeter pulmonary nodules up to 3 mm in size are identified. Left axillary pathologic adenopathy with lymph node conglomerate measuring 2.9 x 1.9 cm, similar to prior. Several additional borderline enlarged left-sided retropectoral nodes are identified. There is a left internal mammary node measuring 5 mm. Small volume right axillary nodes. Tiny supraclavicular nodes. Anterior mediastinal soft tissue with interspersed fat is favored to represent involuting/residual thymus. There is a 3.0 x 2.0 cm left breast mass, concerning for primary neoplasm. There is associated left breast skin thickening. Inferior right hepatic lobe lesions again identified, with peripheral discontinuous nodular enhancement, slightly increased since 2018, most consistent with hemangiomas. There is a 1.5 cm nodular intraluminal soft tissue density associated with the proximal transverse colon. Scattered lytic lesions of the bones are noted at T3 associated with superior endplate depression, T8, and T11 vertebral bodies. Right anterior third and anterolateral sixth rib probable pathologic fractures. There are additional L2-L4 vertebral body lytic lesions. Additionally, there is a right sacral mass which is extending into the sacroiliac joint and neural foramina, image 125 series 2 measuring 4.2 x 3.3 cm. Solid nodular component within the right sacral mass measures 2.6 cm.

## 2025-04-29 NOTE — HISTORY OF PRESENT ILLNESS
[Disease: _____________________] : Disease: [unfilled] [T: ___] : T[unfilled] [N: ___] : N[unfilled] [M: ___] : M[unfilled] [AJCC Stage: ____] : AJCC Stage: [unfilled] [de-identified] : Metastatic left breast cancer with bone metastases diagnosed at age 39. 12/26/21 CTA done when patient presented with right sided CP revealed no evidence of pulmonary embolism. Appearance of osseous metastases within the spine and ribs with associated pathologic fractures of the right third rib, right sixth rib and superior endplate of the T3 vertebral body. Subtle asymmetric skin thickening in the left breast and left axillary lymphadenopathy is suspicious for underlying left sided breast cancer. Left axillary lymphadenopathy is noted.  12/30/21 Mammogram and sonogram revealed a 7 x 5.6 x 6.5 asymmetry with nodularity, calcification and distortion in the left superior breast extending across the midline. Left axillary lymphadenopathy is noted. 01/08/22 CT scan C/A/P: Few scattered subcentimeter pulmonary nodules up to 3 mm in size are identified. Left axillary pathologic adenopathy with lymph node conglomerate measuring 2.9 x 1.9 cm, similar to prior. Several additional borderline enlarged left-sided retropectoral nodes are identified. There is a left internal mammary node measuring 5 mm. Small volume right axillary nodes. Tiny supraclavicular nodes. Anterior mediastinal soft tissue with interspersed fat is favored to represent involuting/residual thymus. There is a 3.0 x 2.0 cm left breast mass, concerning for primary neoplasm. There is associated left breast skin thickening. Inferior right hepatic lobe lesions again identified, with peripheral discontinuous nodular enhancement, slightly increased since 2018, most consistent with hemangiomas. There is a 1.5 cm nodular intraluminal soft tissue density associated with the proximal transverse colon. Scattered lytic lesions of the bones are noted at T3 associated with superior endplate depression, T8, and T11 vertebral bodies. Right anterior third and anterolateral sixth rib probable pathologic fractures. There are additional L2-L4 vertebral body lytic lesions. Additionally, there is a right sacral mass which is extending into the sacroiliac joint and neural foramina, image 125 series 2 measuring 4.2 x 3.3 cm. Solid nodular component within the right sacral mass measures 2.6 cm. 1/13/22 Sonogram guided biopsy of the left breast revealed DCIS and invasive ductal carcinoma, moderately differentiated, ER/SD 90%, HER-2 negative (IHC 1+ membrane staining). Biopsy of left axilla positive for similar IDC. Biopsy of right breast at 9:00 revealed fibrocystic changes and usual ductal hyperplasia. 1/18/22 Genetic testing with a 19 gene panel revealed no pathogenic variants and a VUS in PTEN c.828T>A ([id number]) 1/21/22 Lupron 7.5 mg started and continued monthly until BSO 1/21/22 Xgeva 120 mg started and will continue every 3 months 02/03/22 Bone scan showed increased uptake in T3, punctate uptake in T7 on the right, focal uptake in L3 and right L4, c/w lytic lesions on CT. Increased uptake in right 6th rib c/w fracture noted on CT. Photopenic are in right sacrum c/w right sacral mass on CT. Additional focal uptake in the left parietal bone. 2/04/22 Letrozole and Ibrance started. Ibrance held for one week during radiation 2/16/22 - 02/23/22 Palliative radiation to the sacrum, 2000 cGy in 5 fractions and T2-4 with 2000 cGy in 5 fractions with Dr. Samantha Batres for severe pain 4/7/22 BSO showed benign fallopian tubes and ovaries. 4/2023 Anastrozole started in place of letrozole due to arthralgias with significant improvement. 7/5/23 Guardant 360 did not show any actionable mutations. [de-identified] : ~4 cm Invasive ductal carcinoma, SBR 6/9, +LVI, ER/VA 90%, HER-2 negative [de-identified] : CA 27-29 54.6 prior to treatment and then normalized, CEA normal [de-identified] : Jacy started Lupron 1/21/22 and then 2 weeks later started Letrozole and Ibrance on 02/04/22. S/P BSO on 4/7/22. In 04/23, she transitioned from letrozole to anastrozole with improvement in her generalized arthralgia. Xgeva last given 2/4/25. She is in her first week of her current cycle of Ibrance. She had an episode of colitis at the end of 3/2025 diagnosed on CT scan and treated with antibiotics, most likely triggered by food poisoning. She has an active dental issue being managed by her dentist Dr. Santamaria with an infection associated with a baby tooth that never fell out as she never developed a permanent tooth. She completed clindamycin for 10 days and improved and then had a recurrence. Last Xgeva in 2/25 so tooth will be extracted in 8/25. She received a cortisone injection in her right knee in 6/23 and later had a course of viscous gel injections in 6/24 which helped. Her dermatologist prescribed Opzelura for her progressing Vitiligo, particularly on her face. She has  started the cream but has not been compliant because it may potentially lower her WBC, and she is concerned since her WBC is already low from taking Ibrance. She has chronic GERD which is under control with Protonix and Pepcid. She can't take NSAIDs (including Celebrex) on a regular basis due to her stomach. She is on Lexapro 20 mg which has helped her mood but she continues to struggle emotionally with her diagnosis and treatment effects but is doing better with time. She is  and has two children who are 9 and 11 (3rd and 5th grade). Currently, her children are unaware of her diagnosis.  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 She is adjusting and is starting to like this position more.  PCP: Rosie Myers MD Endocrinologist: Nelia Genao MD GYN: Milady Lamb MD 2/18/25 Bone scan: Mixed changes with improvement of a Right anterolateral sixth rib focus but Right sixth costovertebral focus is slightly more conspicuous. Otherwise stable skull and femora foci. 2/18/25 CT scan C/A/P: Stable exam when compared to 10/3/2024. 10/3/24 Bone scan: Persistent abnormalities in the skull, right sixth rib, and right sacroiliac region without significant interval change since the previous study of 5/15/2024. 10/03/24 CT scan C/A/P showed Stable exam compared to prior 5/15/2024. 5/15/24 CT scan C/A/P showed bony metastatic disease, not significantly changed from prior examination 2/20/2024. Left pelvic soft tissue density, probably enlarged lymph node, also not significantly changed. New healing fracture of the right anterior lateral sixth rib. 5/15/24 Bone scan showed interval increase in uptake in a right 6th rib lesion. Interval resolution of uptake in the right 4th and 5th ribs. 2/20/24 CT scan C/A/P showed enlarging left lower quadrant/pelvic soft tissue nodular density measuring 1.5 cm x 1.2 cm anterior to the left external iliac artery, series 2 image 140 may represent an enlarging lymph node versus mesenteric implant. Stable osseous metastasis. 02/20/24 Bone scan showed new punctate right rib lesions, which are most consistent with posttraumatic changes. Recommend interval short-term follow-up to assess for healing versus progression of disease. Bone metastases evident on prior exam are similar to or less apparent than on prior exam. 11/10/23 Bone scan showed no significant interval change compared to the previous bone scan of 7/24/2023. 11/10/23 CT scan showed no significant change. Stable presumed bone metastases. 7/24/23 Bone scan showed Interval improvement of lesions along the right femoral shaft and in the right hemisacrum. Otherwise grossly stable exam. 7/19/23 CT scan showed stable disease. 4/14/23 CT scan showed stable disease. 1/16/23 Bone scan showed stability with slight interval improvement in previously noted skeletal foci of disease. No new foci. 12/8/22 CT scan C/A/P showed stable interval examination with stable scattered bilateral small pulmonary nodules and no significant interval change in mixed lytic and sclerotic osseous metastases. Redemonstrated ill defined left breast mass with biopsy clip and associated left spiculated axillary lymph node with biopsy clip. 9/16/22 CT scan C/A/P showed the 3.7 cm right sacral mass is unchanged. Small sclerotic lesion in the left iliac wing is unchanged in size, more sclerotic on the current study. Mixed lytic and sclerotic osseous metastases are again noted, some of which demonstrate slightly increased peripheral sclerosis, likely reflecting posttreatment change. Previously noted tiny bilateral subcentimeter pulmonary nodules are largely unchanged. A previously referenced 1 to 2 mm right lower lobe pulmonary nodule is not definitively seen on the current exam however. 07/01/22 Bone scan showed tracer distribution of metastatic lesions is not significantly changed. Tracer activity is mostly less intense/unchanged except in the right sacrum which has more intense uptake. Small foci of increased uptake in the mid and distal right femur, more conspicuous than the previous study. Degenerative disease in major joints. 4/26/22 CT scan C/A/P Interval decrease in size of left breast mass and axillary adenopathy. Majority of tiny pulmonary nodules are no longer visualized, as described above. Several lytic bone metastases demonstrate increased sclerosis, suggesting healing. A 3.6 cm right sacral lytic mass is unchanged. Indeterminate nodular fold thickening within the proximal/mid transverse colon, less prominent, however correlation with colonoscopy is suggested. Other findings are unchanged, as described above. 02/03/22 Bone scan showed increased uptake in T3, punctate uptake in T7 on the right, focal uptake in L3 and right L4, c/w lytic lesions on CT. Increased uptake in right 6th rib c/w fracture noted on CT. Photopenic are in right sacrum c/w right sacral mass on CT. Additional focal uptake in the left parietal bone. 01/08/22 CT scan C/A/P: Few scattered subcentimeter pulmonary nodules up to 3 mm in size are identified. Left axillary pathologic adenopathy with lymph node conglomerate measuring 2.9 x 1.9 cm, similar to prior. Several additional borderline enlarged left-sided retropectoral nodes are identified. There is a left internal mammary node measuring 5 mm. Small volume right axillary nodes. Tiny supraclavicular nodes. Anterior mediastinal soft tissue with interspersed fat is favored to represent involuting/residual thymus. There is a 3.0 x 2.0 cm left breast mass, concerning for primary neoplasm. There is associated left breast skin thickening. Inferior right hepatic lobe lesions again identified, with peripheral discontinuous nodular enhancement, slightly increased since 2018, most consistent with hemangiomas. There is a 1.5 cm nodular intraluminal soft tissue density associated with the proximal transverse colon. Scattered lytic lesions of the bones are noted at T3 associated with superior endplate depression, T8, and T11 vertebral bodies. Right anterior third and anterolateral sixth rib probable pathologic fractures. There are additional L2-L4 vertebral body lytic lesions. Additionally, there is a right sacral mass which is extending into the sacroiliac joint and neural foramina, image 125 series 2 measuring 4.2 x 3.3 cm. Solid nodular component within the right sacral mass measures 2.6 cm.

## 2025-04-29 NOTE — PHYSICAL EXAM
[Fully active, able to carry on all pre-disease performance without restriction] : Status 0 - Fully active, able to carry on all pre-disease performance without restriction [Normal] : affect appropriate [de-identified] : Left breast mass ~3 x 3 cm. No palpable LN [de-identified] : hypopigmentation of on forehead and perioral region and nasal region.

## 2025-06-18 NOTE — PHYSICAL EXAM
[de-identified] : Left breast mass smaller. No palpable LN [de-identified] : hypopigmentation of on forehead and perioral region and nasal region.

## 2025-06-18 NOTE — END OF VISIT
[FreeTextEntry3] : Patient being seen as per physician's primary plan of care. Partial Purse String (Intermediate) Text: Given the location of the defect and the characteristics of the surrounding skin an intermediate purse string closure was deemed most appropriate.  Undermining was performed circumfirentially around the surgical defect.  A purse string suture was then placed and tightened. Wound tension only allowed a partial closure of the circular defect.

## 2025-06-18 NOTE — PHYSICAL EXAM
[de-identified] : Left breast mass smaller. No palpable LN [de-identified] : hypopigmentation of on forehead and perioral region and nasal region.

## 2025-06-18 NOTE — HISTORY OF PRESENT ILLNESS
[de-identified] : ~4 cm Invasive ductal carcinoma, SBR 6/9, +LVI, ER/[place] 90%, HER-2 negative [de-identified] : Metastatic left breast cancer with bone metastases diagnosed at age 39. 12/26/21 CTA done when patient presented with right sided CP revealed no evidence of pulmonary embolism. Appearance of osseous metastases within the spine and ribs with associated pathologic fractures of the right third rib, right sixth rib and superior endplate of the T3 vertebral body. Subtle asymmetric skin thickening in the left breast and left axillary lymphadenopathy is suspicious for underlying left sided breast cancer. Left axillary lymphadenopathy is noted.  12/30/21 Mammogram and sonogram revealed a 7 x 5.6 x 6.5 asymmetry with nodularity, calcification and distortion in the left superior breast extending across the midline. Left axillary lymphadenopathy is noted. 01/08/22 CT scan C/A/P: Few scattered subcentimeter pulmonary nodules up to 3 mm in size are identified. Left axillary pathologic adenopathy with lymph node conglomerate measuring 2.9 x 1.9 cm, similar to prior. Several additional borderline enlarged left-sided retropectoral nodes are identified. There is a left internal mammary node measuring 5 mm. Small volume right axillary nodes. Tiny supraclavicular nodes. Anterior mediastinal soft tissue with interspersed fat is favored to represent involuting/residual thymus. There is a 3.0 x 2.0 cm left breast mass, concerning for primary neoplasm. There is associated left breast skin thickening. Inferior right hepatic lobe lesions again identified, with peripheral discontinuous nodular enhancement, slightly increased since 2018, most consistent with hemangiomas. There is a 1.5 cm nodular intraluminal soft tissue density associated with the proximal transverse colon. Scattered lytic lesions of the bones are noted at T3 associated with superior endplate depression, T8, and T11 vertebral bodies. Right anterior third and anterolateral sixth rib probable pathologic fractures. There are additional L2-L4 vertebral body lytic lesions. Additionally, there is a right sacral mass which is extending into the sacroiliac joint and neural foramina, image 125 series 2 measuring 4.2 x 3.3 cm. Solid nodular component within the right sacral mass measures 2.6 cm. 1/13/22 Sonogram guided biopsy of the left breast revealed DCIS and invasive ductal carcinoma, moderately differentiated, ER/[place] 90%, HER-2 negative (IHC 1+ membrane staining). Biopsy of left axilla positive for similar IDC. Biopsy of right breast at 9:00 revealed fibrocystic changes and usual ductal hyperplasia. 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 Increased uptake in right 6th rib c/w fracture noted on CT. Photopenic are in right sacrum c/w right sacral mass on CT. Additional focal uptake in the left parietal bone. 2/04/22 Letrozole and Ibrance started. Ibrance held for one week during radiation 2/16/22 - 02/23/22 Palliative radiation to the sacrum, 2000 cGy in 5 fractions and T2-4 with 2000 cGy in 5 fractions with Dr. Samantha Batres for severe pain 4/7/22 BSO showed benign fallopian tubes and ovaries. 4/2023 Anastrozole started in place of letrozole due to arthralgias with significant improvement. 7/5/23 Guardant 360 did not show any actionable mutations. [de-identified] : CA 27-29 54.6 prior to treatment and then normalized, CEA normal [de-identified] : Jacy started Lupron 1/21/22 and then 2 weeks later started Letrozole and Ibrance on 02/04/22. S/P BSO on 4/7/22. In 04/23, she transitioned from letrozole to anastrozole with improvement in her generalized arthralgia. Xgeva last given 2/4/25. She is in her first week of her current cycle of Ibrance. She had an episode of colitis at the end of 3/2025 diagnosed on CT scan and treated with antibiotics, most likely triggered by food poisoning. She has an active dental issue being managed by her dentist Dr. Santamaria with an infection associated with a baby tooth that never fell out as she never developed a permanent tooth. She completed clindamycin for 10 days and improved and then had a recurrence. Last Xgeva in 2/25 so tooth will be extracted in 8/25. She received a cortisone injection in her right knee in 6/23 and later had a course of viscous gel injections in 6/24 which helped. Her dermatologist prescribed Opzelura for her progressing Vitiligo, particularly on her face. She has  started the cream but has not been compliant because it may potentially lower her WBC, and she is concerned since her WBC is already low from taking Ibrance. She has chronic GERD which is under control with Protonix and Pepcid. She can't take NSAIDs (including Celebrex) on a regular basis due to her stomach. She is on Lexapro 20 mg which has helped her mood but she continues to struggle emotionally with her diagnosis and treatment effects but is doing better with time. She is  and has two children who are 9 and 11 (3rd and 5th grade). Currently, her children are unaware of her diagnosis.  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 She is adjusting and is starting to like this position more.  PCP: Rosie Myers MD Endocrinologist: Nelia Genao MD GYN: Milady Lamb MD  6/5/25 Bone scan showed stable focus of increased radiotracer uptake in the left frontoparietal skull. Right sixth costovertebral focus is slightly more conspicuous. Stable subtle foci along the medial aspect of bilateral proximal femora. T8 focus is also more conspicuous. Stable focus of uptake is noted in the right lower teeth which may represent dental disease. Redemonstration of degenerative changes in the shoulders, hips, knees, ankles and feet. No definite new foci of increased radiopharmaceutical activity are identified. 4/29/25 CT C/A/P showed stable exam when compared to 10/3/2024 and 2/18/2025. 2/18/25 Bone scan: Mixed changes with improvement of a Right anterolateral sixth rib focus but Right sixth costovertebral focus is slightly more conspicuous. Otherwise stable skull and femora foci. 2/18/25 CT scan C/A/P: Stable exam when compared to 10/3/2024. 10/3/24 Bone scan: Persistent abnormalities in the skull, right sixth rib, and right sacroiliac region without significant interval change since the previous study of 5/15/2024. 10/03/24 CT scan C/A/P showed Stable exam compared to prior 5/15/2024. 5/15/24 CT scan C/A/P showed bony metastatic disease, not significantly changed from prior examination 2/20/2024. Left pelvic soft tissue density, probably enlarged lymph node, also not significantly changed. New healing fracture of the right anterior lateral sixth rib. 5/15/24 Bone scan showed interval increase in uptake in a right 6th rib lesion. Interval resolution of uptake in the right 4th and 5th ribs. 2/20/24 CT scan C/A/P showed enlarging left lower quadrant/pelvic soft tissue nodular density measuring 1.5 cm x 1.2 cm anterior to the left external iliac artery, series 2 image 140 may represent an enlarging lymph node versus mesenteric implant. Stable osseous metastasis. 02/20/24 Bone scan showed new punctate right rib lesions, which are most consistent with posttraumatic changes. Recommend interval short-term follow-up to assess for healing versus progression of disease. Bone metastases evident on prior exam are similar to or less apparent than on prior exam. 11/10/23 Bone scan showed no significant interval change compared to the previous bone scan of 7/24/2023. 11/10/23 CT scan showed no significant change. Stable presumed bone metastases. 7/24/23 Bone scan showed Interval improvement of lesions along the right femoral shaft and in the right hemisacrum. Otherwise grossly stable exam. 7/19/23 CT scan showed stable disease. 4/14/23 CT scan showed stable disease. 1/16/23 Bone scan showed stability with slight interval improvement in previously noted skeletal foci of disease. No new foci. 12/8/22 CT scan C/A/P showed stable interval examination with stable scattered bilateral small pulmonary nodules and no significant interval change in mixed lytic and sclerotic osseous metastases. Redemonstrated ill defined left breast mass with biopsy clip and associated left spiculated axillary lymph node with biopsy clip. 9/16/22 CT scan C/A/P showed the 3.7 cm right sacral mass is unchanged. Small sclerotic lesion in the left iliac wing is unchanged in size, more sclerotic on the current study. Mixed lytic and sclerotic osseous metastases are again noted, some of which demonstrate slightly increased peripheral sclerosis, likely reflecting posttreatment change. Previously noted tiny bilateral subcentimeter pulmonary nodules are largely unchanged. A previously referenced 1 to 2 mm right lower lobe pulmonary nodule is not definitively seen on the current exam however. 07/01/22 Bone scan showed tracer distribution of metastatic lesions is not significantly changed. Tracer activity is mostly less intense/unchanged except in the right sacrum which has more intense uptake. Small foci of increased uptake in the mid and distal right femur, more conspicuous than the previous study. Degenerative disease in major joints. 4/26/22 CT scan C/A/P Interval decrease in size of left breast mass and axillary adenopathy. Majority of tiny pulmonary nodules are no longer visualized, as described above. Several lytic bone metastases demonstrate increased sclerosis, suggesting healing. A 3.6 cm right sacral lytic mass is unchanged. Indeterminate nodular fold thickening within the proximal/mid transverse colon, less prominent, however correlation with colonoscopy is suggested. Other findings are unchanged, as described above. 02/03/22 Bone scan showed increased uptake in T3, punctate uptake in T7 on the right, focal uptake in L3 and right L4, c/w lytic lesions on CT. Increased uptake in right 6th rib c/w fracture noted on CT. Photopenic are in right sacrum c/w right sacral mass on CT. Additional focal uptake in the left parietal bone. 01/08/22 CT scan C/A/P: Few scattered subcentimeter pulmonary nodules up to 3 mm in size are identified. Left axillary pathologic adenopathy with lymph node conglomerate measuring 2.9 x 1.9 cm, similar to prior. Several additional borderline enlarged left-sided retropectoral nodes are identified. There is a left internal mammary node measuring 5 mm. Small volume right axillary nodes. Tiny supraclavicular nodes. Anterior mediastinal soft tissue with interspersed fat is favored to represent involuting/residual thymus. There is a 3.0 x 2.0 cm left breast mass, concerning for primary neoplasm. There is associated left breast skin thickening. Inferior right hepatic lobe lesions again identified, with peripheral discontinuous nodular enhancement, slightly increased since 2018, most consistent with hemangiomas. There is a 1.5 cm nodular intraluminal soft tissue density associated with the proximal transverse colon. Scattered lytic lesions of the bones are noted at T3 associated with superior endplate depression, T8, and T11 vertebral bodies. Right anterior third and anterolateral sixth rib probable pathologic fractures. There are additional L2-L4 vertebral body lytic lesions. Additionally, there is a right sacral mass which is extending into the sacroiliac joint and neural foramina, image 125 series 2 measuring 4.2 x 3.3 cm. Solid nodular component within the right sacral mass measures 2.6 cm.

## 2025-06-18 NOTE — PHYSICAL EXAM
[de-identified] : Left breast mass smaller. No palpable LN [de-identified] : hypopigmentation of on forehead and perioral region and nasal region.

## 2025-06-18 NOTE — HISTORY OF PRESENT ILLNESS
[de-identified] : Metastatic left breast cancer with bone metastases diagnosed at age 39. 12/26/21 CTA done when patient presented with right sided CP revealed no evidence of pulmonary embolism. Appearance of osseous metastases within the spine and ribs with associated pathologic fractures of the right third rib, right sixth rib and superior endplate of the T3 vertebral body. Subtle asymmetric skin thickening in the left breast and left axillary lymphadenopathy is suspicious for underlying left sided breast cancer. Left axillary lymphadenopathy is noted.  12/30/21 Mammogram and sonogram revealed a 7 x 5.6 x 6.5 asymmetry with nodularity, calcification and distortion in the left superior breast extending across the midline. Left axillary lymphadenopathy is noted. 01/08/22 CT scan C/A/P: Few scattered subcentimeter pulmonary nodules up to 3 mm in size are identified. Left axillary pathologic adenopathy with lymph node conglomerate measuring 2.9 x 1.9 cm, similar to prior. Several additional borderline enlarged left-sided retropectoral nodes are identified. There is a left internal mammary node measuring 5 mm. Small volume right axillary nodes. Tiny supraclavicular nodes. Anterior mediastinal soft tissue with interspersed fat is favored to represent involuting/residual thymus. There is a 3.0 x 2.0 cm left breast mass, concerning for primary neoplasm. There is associated left breast skin thickening. Inferior right hepatic lobe lesions again identified, with peripheral discontinuous nodular enhancement, slightly increased since 2018, most consistent with hemangiomas. There is a 1.5 cm nodular intraluminal soft tissue density associated with the proximal transverse colon. Scattered lytic lesions of the bones are noted at T3 associated with superior endplate depression, T8, and T11 vertebral bodies. Right anterior third and anterolateral sixth rib probable pathologic fractures. There are additional L2-L4 vertebral body lytic lesions. Additionally, there is a right sacral mass which is extending into the sacroiliac joint and neural foramina, image 125 series 2 measuring 4.2 x 3.3 cm. Solid nodular component within the right sacral mass measures 2.6 cm. 1/13/22 Sonogram guided biopsy of the left breast revealed DCIS and invasive ductal carcinoma, moderately differentiated, ER/[place] 90%, HER-2 negative (IHC 1+ membrane staining). Biopsy of left axilla positive for similar IDC. Biopsy of right breast at 9:00 revealed fibrocystic changes and usual ductal hyperplasia. 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 Increased uptake in right 6th rib c/w fracture noted on CT. Photopenic are in right sacrum c/w right sacral mass on CT. Additional focal uptake in the left parietal bone. 2/04/22 Letrozole and Ibrance started. Ibrance held for one week during radiation 2/16/22 - 02/23/22 Palliative radiation to the sacrum, 2000 cGy in 5 fractions and T2-4 with 2000 cGy in 5 fractions with Dr. Samantha Batres for severe pain 4/7/22 BSO showed benign fallopian tubes and ovaries. 4/2023 Anastrozole started in place of letrozole due to arthralgias with significant improvement. 7/5/23 Guardant 360 did not show any actionable mutations. [de-identified] : ~4 cm Invasive ductal carcinoma, SBR 6/9, +LVI, ER/[place] 90%, HER-2 negative [de-identified] : CA 27-29 54.6 prior to treatment and then normalized, CEA normal [de-identified] : Jacy started Lupron 1/21/22 and then 2 weeks later started Letrozole and Ibrance on 02/04/22. S/P BSO on 4/7/22. In 04/23, she transitioned from letrozole to anastrozole with improvement in her generalized arthralgia. Xgeva last given 2/4/25. She is in her first week of her current cycle of Ibrance. She had an episode of colitis at the end of 3/2025 diagnosed on CT scan and treated with antibiotics, most likely triggered by food poisoning. She has an active dental issue being managed by her dentist Dr. Santamaria with an infection associated with a baby tooth that never fell out as she never developed a permanent tooth. She completed clindamycin for 10 days and improved and then had a recurrence. Last Xgeva in 2/25 so tooth will be extracted in 8/25. She received a cortisone injection in her right knee in 6/23 and later had a course of viscous gel injections in 6/24 which helped. Her dermatologist prescribed Opzelura for her progressing Vitiligo, particularly on her face. She has  started the cream but has not been compliant because it may potentially lower her WBC, and she is concerned since her WBC is already low from taking Ibrance. She has chronic GERD which is under control with Protonix and Pepcid. She can't take NSAIDs (including Celebrex) on a regular basis due to her stomach. She is on Lexapro 20 mg which has helped her mood but she continues to struggle emotionally with her diagnosis and treatment effects but is doing better with time. She is  and has two children who are 9 and 11 (3rd and 5th grade). Currently, her children are unaware of her diagnosis.  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 She is adjusting and is starting to like this position more.  PCP: Rosie Myers MD Endocrinologist: Nelia Genao MD GYN: Milady Lamb MD  6/5/25 Bone scan showed stable focus of increased radiotracer uptake in the left frontoparietal skull. Right sixth costovertebral focus is slightly more conspicuous. Stable subtle foci along the medial aspect of bilateral proximal femora. T8 focus is also more conspicuous. Stable focus of uptake is noted in the right lower teeth which may represent dental disease. Redemonstration of degenerative changes in the shoulders, hips, knees, ankles and feet. No definite new foci of increased radiopharmaceutical activity are identified. 4/29/25 CT C/A/P showed stable exam when compared to 10/3/2024 and 2/18/2025. 2/18/25 Bone scan: Mixed changes with improvement of a Right anterolateral sixth rib focus but Right sixth costovertebral focus is slightly more conspicuous. Otherwise stable skull and femora foci. 2/18/25 CT scan C/A/P: Stable exam when compared to 10/3/2024. 10/3/24 Bone scan: Persistent abnormalities in the skull, right sixth rib, and right sacroiliac region without significant interval change since the previous study of 5/15/2024. 10/03/24 CT scan C/A/P showed Stable exam compared to prior 5/15/2024. 5/15/24 CT scan C/A/P showed bony metastatic disease, not significantly changed from prior examination 2/20/2024. Left pelvic soft tissue density, probably enlarged lymph node, also not significantly changed. New healing fracture of the right anterior lateral sixth rib. 5/15/24 Bone scan showed interval increase in uptake in a right 6th rib lesion. Interval resolution of uptake in the right 4th and 5th ribs. 2/20/24 CT scan C/A/P showed enlarging left lower quadrant/pelvic soft tissue nodular density measuring 1.5 cm x 1.2 cm anterior to the left external iliac artery, series 2 image 140 may represent an enlarging lymph node versus mesenteric implant. Stable osseous metastasis. 02/20/24 Bone scan showed new punctate right rib lesions, which are most consistent with posttraumatic changes. Recommend interval short-term follow-up to assess for healing versus progression of disease. Bone metastases evident on prior exam are similar to or less apparent than on prior exam. 11/10/23 Bone scan showed no significant interval change compared to the previous bone scan of 7/24/2023. 11/10/23 CT scan showed no significant change. Stable presumed bone metastases. 7/24/23 Bone scan showed Interval improvement of lesions along the right femoral shaft and in the right hemisacrum. Otherwise grossly stable exam. 7/19/23 CT scan showed stable disease. 4/14/23 CT scan showed stable disease. 1/16/23 Bone scan showed stability with slight interval improvement in previously noted skeletal foci of disease. No new foci. 12/8/22 CT scan C/A/P showed stable interval examination with stable scattered bilateral small pulmonary nodules and no significant interval change in mixed lytic and sclerotic osseous metastases. Redemonstrated ill defined left breast mass with biopsy clip and associated left spiculated axillary lymph node with biopsy clip. 9/16/22 CT scan C/A/P showed the 3.7 cm right sacral mass is unchanged. Small sclerotic lesion in the left iliac wing is unchanged in size, more sclerotic on the current study. Mixed lytic and sclerotic osseous metastases are again noted, some of which demonstrate slightly increased peripheral sclerosis, likely reflecting posttreatment change. Previously noted tiny bilateral subcentimeter pulmonary nodules are largely unchanged. A previously referenced 1 to 2 mm right lower lobe pulmonary nodule is not definitively seen on the current exam however. 07/01/22 Bone scan showed tracer distribution of metastatic lesions is not significantly changed. Tracer activity is mostly less intense/unchanged except in the right sacrum which has more intense uptake. Small foci of increased uptake in the mid and distal right femur, more conspicuous than the previous study. Degenerative disease in major joints. 4/26/22 CT scan C/A/P Interval decrease in size of left breast mass and axillary adenopathy. Majority of tiny pulmonary nodules are no longer visualized, as described above. Several lytic bone metastases demonstrate increased sclerosis, suggesting healing. A 3.6 cm right sacral lytic mass is unchanged. Indeterminate nodular fold thickening within the proximal/mid transverse colon, less prominent, however correlation with colonoscopy is suggested. Other findings are unchanged, as described above. 02/03/22 Bone scan showed increased uptake in T3, punctate uptake in T7 on the right, focal uptake in L3 and right L4, c/w lytic lesions on CT. Increased uptake in right 6th rib c/w fracture noted on CT. Photopenic are in right sacrum c/w right sacral mass on CT. Additional focal uptake in the left parietal bone. 01/08/22 CT scan C/A/P: Few scattered subcentimeter pulmonary nodules up to 3 mm in size are identified. Left axillary pathologic adenopathy with lymph node conglomerate measuring 2.9 x 1.9 cm, similar to prior. Several additional borderline enlarged left-sided retropectoral nodes are identified. There is a left internal mammary node measuring 5 mm. Small volume right axillary nodes. Tiny supraclavicular nodes. Anterior mediastinal soft tissue with interspersed fat is favored to represent involuting/residual thymus. There is a 3.0 x 2.0 cm left breast mass, concerning for primary neoplasm. There is associated left breast skin thickening. Inferior right hepatic lobe lesions again identified, with peripheral discontinuous nodular enhancement, slightly increased since 2018, most consistent with hemangiomas. There is a 1.5 cm nodular intraluminal soft tissue density associated with the proximal transverse colon. Scattered lytic lesions of the bones are noted at T3 associated with superior endplate depression, T8, and T11 vertebral bodies. Right anterior third and anterolateral sixth rib probable pathologic fractures. There are additional L2-L4 vertebral body lytic lesions. Additionally, there is a right sacral mass which is extending into the sacroiliac joint and neural foramina, image 125 series 2 measuring 4.2 x 3.3 cm. Solid nodular component within the right sacral mass measures 2.6 cm.

## 2025-06-18 NOTE — HISTORY OF PRESENT ILLNESS
[de-identified] : Metastatic left breast cancer with bone metastases diagnosed at age 39. 12/26/21 CTA done when patient presented with right sided CP revealed no evidence of pulmonary embolism. Appearance of osseous metastases within the spine and ribs with associated pathologic fractures of the right third rib, right sixth rib and superior endplate of the T3 vertebral body. Subtle asymmetric skin thickening in the left breast and left axillary lymphadenopathy is suspicious for underlying left sided breast cancer. Left axillary lymphadenopathy is noted.  12/30/21 Mammogram and sonogram revealed a 7 x 5.6 x 6.5 asymmetry with nodularity, calcification and distortion in the left superior breast extending across the midline. Left axillary lymphadenopathy is noted. 01/08/22 CT scan C/A/P: Few scattered subcentimeter pulmonary nodules up to 3 mm in size are identified. Left axillary pathologic adenopathy with lymph node conglomerate measuring 2.9 x 1.9 cm, similar to prior. Several additional borderline enlarged left-sided retropectoral nodes are identified. There is a left internal mammary node measuring 5 mm. Small volume right axillary nodes. Tiny supraclavicular nodes. Anterior mediastinal soft tissue with interspersed fat is favored to represent involuting/residual thymus. There is a 3.0 x 2.0 cm left breast mass, concerning for primary neoplasm. There is associated left breast skin thickening. Inferior right hepatic lobe lesions again identified, with peripheral discontinuous nodular enhancement, slightly increased since 2018, most consistent with hemangiomas. There is a 1.5 cm nodular intraluminal soft tissue density associated with the proximal transverse colon. Scattered lytic lesions of the bones are noted at T3 associated with superior endplate depression, T8, and T11 vertebral bodies. Right anterior third and anterolateral sixth rib probable pathologic fractures. There are additional L2-L4 vertebral body lytic lesions. Additionally, there is a right sacral mass which is extending into the sacroiliac joint and neural foramina, image 125 series 2 measuring 4.2 x 3.3 cm. Solid nodular component within the right sacral mass measures 2.6 cm. 1/13/22 Sonogram guided biopsy of the left breast revealed DCIS and invasive ductal carcinoma, moderately differentiated, ER/FL 90%, HER-2 negative (IHC 1+ membrane staining). Biopsy of left axilla positive for similar IDC. Biopsy of right breast at 9:00 revealed fibrocystic changes and usual ductal hyperplasia. 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 Increased uptake in right 6th rib c/w fracture noted on CT. Photopenic are in right sacrum c/w right sacral mass on CT. Additional focal uptake in the left parietal bone. 2/04/22 Letrozole and Ibrance started. Ibrance held for one week during radiation 2/16/22 - 02/23/22 Palliative radiation to the sacrum, 2000 cGy in 5 fractions and T2-4 with 2000 cGy in 5 fractions with Dr. Samantha Batres for severe pain 4/7/22 BSO showed benign fallopian tubes and ovaries. 4/2023 Anastrozole started in place of letrozole due to arthralgias with significant improvement. 7/5/23 Guardant 360 did not show any actionable mutations. [de-identified] : ~4 cm Invasive ductal carcinoma, SBR 6/9, +LVI, ER/LA 90%, HER-2 negative [de-identified] : CA 27-29 54.6 prior to treatment and then normalized, CEA normal [de-identified] : Jacy started Lupron 1/21/22 and then 2 weeks later started Letrozole and Ibrance on 02/04/22. S/P BSO on 4/7/22. In 04/23, she transitioned from letrozole to anastrozole with improvement in her generalized arthralgia. Xgeva last given 2/4/25. She is in her first week of her current cycle of Ibrance. She had an episode of colitis at the end of 3/2025 diagnosed on CT scan and treated with antibiotics, most likely triggered by food poisoning. She has an active dental issue being managed by her dentist Dr. Santamaria with an infection associated with a baby tooth that never fell out as she never developed a permanent tooth. She completed clindamycin for 10 days and improved and then had a recurrence. Last Xgeva in 2/25 so tooth will be extracted in 8/25. She received a cortisone injection in her right knee in 6/23 and later had a course of viscous gel injections in 6/24 which helped. Her dermatologist prescribed Opzelura for her progressing Vitiligo, particularly on her face. She has  started the cream but has not been compliant because it may potentially lower her WBC, and she is concerned since her WBC is already low from taking Ibrance. She has chronic GERD which is under control with Protonix and Pepcid. She can't take NSAIDs (including Celebrex) on a regular basis due to her stomach. She is on Lexapro 20 mg which has helped her mood but she continues to struggle emotionally with her diagnosis and treatment effects but is doing better with time. She is  and has two children who are 9 and 11 (3rd and 5th grade). Currently, her children are unaware of her diagnosis.  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 She is adjusting and is starting to like this position more.  PCP: Rosie Myers MD Endocrinologist: Nelia Genao MD GYN: Milady Lamb MD  6/5/25 Bone scan showed stable focus of increased radiotracer uptake in the left frontoparietal skull. Right sixth costovertebral focus is slightly more conspicuous. Stable subtle foci along the medial aspect of bilateral proximal femora. T8 focus is also more conspicuous. Stable focus of uptake is noted in the right lower teeth which may represent dental disease. Redemonstration of degenerative changes in the shoulders, hips, knees, ankles and feet. No definite new foci of increased radiopharmaceutical activity are identified. 4/29/25 CT C/A/P showed stable exam when compared to 10/3/2024 and 2/18/2025. 2/18/25 Bone scan: Mixed changes with improvement of a Right anterolateral sixth rib focus but Right sixth costovertebral focus is slightly more conspicuous. Otherwise stable skull and femora foci. 2/18/25 CT scan C/A/P: Stable exam when compared to 10/3/2024. 10/3/24 Bone scan: Persistent abnormalities in the skull, right sixth rib, and right sacroiliac region without significant interval change since the previous study of 5/15/2024. 10/03/24 CT scan C/A/P showed Stable exam compared to prior 5/15/2024. 5/15/24 CT scan C/A/P showed bony metastatic disease, not significantly changed from prior examination 2/20/2024. Left pelvic soft tissue density, probably enlarged lymph node, also not significantly changed. New healing fracture of the right anterior lateral sixth rib. 5/15/24 Bone scan showed interval increase in uptake in a right 6th rib lesion. Interval resolution of uptake in the right 4th and 5th ribs. 2/20/24 CT scan C/A/P showed enlarging left lower quadrant/pelvic soft tissue nodular density measuring 1.5 cm x 1.2 cm anterior to the left external iliac artery, series 2 image 140 may represent an enlarging lymph node versus mesenteric implant. Stable osseous metastasis. 02/20/24 Bone scan showed new punctate right rib lesions, which are most consistent with posttraumatic changes. Recommend interval short-term follow-up to assess for healing versus progression of disease. Bone metastases evident on prior exam are similar to or less apparent than on prior exam. 11/10/23 Bone scan showed no significant interval change compared to the previous bone scan of 7/24/2023. 11/10/23 CT scan showed no significant change. Stable presumed bone metastases. 7/24/23 Bone scan showed Interval improvement of lesions along the right femoral shaft and in the right hemisacrum. Otherwise grossly stable exam. 7/19/23 CT scan showed stable disease. 4/14/23 CT scan showed stable disease. 1/16/23 Bone scan showed stability with slight interval improvement in previously noted skeletal foci of disease. No new foci. 12/8/22 CT scan C/A/P showed stable interval examination with stable scattered bilateral small pulmonary nodules and no significant interval change in mixed lytic and sclerotic osseous metastases. Redemonstrated ill defined left breast mass with biopsy clip and associated left spiculated axillary lymph node with biopsy clip. 9/16/22 CT scan C/A/P showed the 3.7 cm right sacral mass is unchanged. Small sclerotic lesion in the left iliac wing is unchanged in size, more sclerotic on the current study. Mixed lytic and sclerotic osseous metastases are again noted, some of which demonstrate slightly increased peripheral sclerosis, likely reflecting posttreatment change. Previously noted tiny bilateral subcentimeter pulmonary nodules are largely unchanged. A previously referenced 1 to 2 mm right lower lobe pulmonary nodule is not definitively seen on the current exam however. 07/01/22 Bone scan showed tracer distribution of metastatic lesions is not significantly changed. Tracer activity is mostly less intense/unchanged except in the right sacrum which has more intense uptake. Small foci of increased uptake in the mid and distal right femur, more conspicuous than the previous study. Degenerative disease in major joints. 4/26/22 CT scan C/A/P Interval decrease in size of left breast mass and axillary adenopathy. Majority of tiny pulmonary nodules are no longer visualized, as described above. Several lytic bone metastases demonstrate increased sclerosis, suggesting healing. A 3.6 cm right sacral lytic mass is unchanged. Indeterminate nodular fold thickening within the proximal/mid transverse colon, less prominent, however correlation with colonoscopy is suggested. Other findings are unchanged, as described above. 02/03/22 Bone scan showed increased uptake in T3, punctate uptake in T7 on the right, focal uptake in L3 and right L4, c/w lytic lesions on CT. Increased uptake in right 6th rib c/w fracture noted on CT. Photopenic are in right sacrum c/w right sacral mass on CT. Additional focal uptake in the left parietal bone. 01/08/22 CT scan C/A/P: Few scattered subcentimeter pulmonary nodules up to 3 mm in size are identified. Left axillary pathologic adenopathy with lymph node conglomerate measuring 2.9 x 1.9 cm, similar to prior. Several additional borderline enlarged left-sided retropectoral nodes are identified. There is a left internal mammary node measuring 5 mm. Small volume right axillary nodes. Tiny supraclavicular nodes. Anterior mediastinal soft tissue with interspersed fat is favored to represent involuting/residual thymus. There is a 3.0 x 2.0 cm left breast mass, concerning for primary neoplasm. There is associated left breast skin thickening. Inferior right hepatic lobe lesions again identified, with peripheral discontinuous nodular enhancement, slightly increased since 2018, most consistent with hemangiomas. There is a 1.5 cm nodular intraluminal soft tissue density associated with the proximal transverse colon. Scattered lytic lesions of the bones are noted at T3 associated with superior endplate depression, T8, and T11 vertebral bodies. Right anterior third and anterolateral sixth rib probable pathologic fractures. There are additional L2-L4 vertebral body lytic lesions. Additionally, there is a right sacral mass which is extending into the sacroiliac joint and neural foramina, image 125 series 2 measuring 4.2 x 3.3 cm. Solid nodular component within the right sacral mass measures 2.6 cm.